# Patient Record
Sex: FEMALE | Race: WHITE | NOT HISPANIC OR LATINO | Employment: OTHER | URBAN - METROPOLITAN AREA
[De-identification: names, ages, dates, MRNs, and addresses within clinical notes are randomized per-mention and may not be internally consistent; named-entity substitution may affect disease eponyms.]

---

## 2017-02-19 ENCOUNTER — E-VISIT (OUTPATIENT)
Dept: FAMILY MEDICINE | Facility: CLINIC | Age: 22
End: 2017-02-19
Payer: COMMERCIAL

## 2017-02-19 DIAGNOSIS — F90.8 ATTENTION-DEFICIT HYPERACTIVITY DISORDER, OTHER TYPE: Primary | ICD-10-CM

## 2017-02-19 DIAGNOSIS — Z79.899 CONTROLLED SUBSTANCE AGREEMENT SIGNED: ICD-10-CM

## 2017-02-19 PROCEDURE — 98969 ZZC NONPHYSICIAN ONLINE ASSESSMENT AND MANAGEMENT: CPT | Performed by: NURSE PRACTITIONER

## 2017-02-19 NOTE — MR AVS SNAPSHOT
After Visit Summary   2/19/2017    Gabriela Hall    MRN: [id number]           Patient Information     Date Of Birth          1995        Visit Information        Provider Department      2/19/2017 5:17 PM Elva Davalos APRN CNP [facility]        Today's Diagnoses     Attention-deficit hyperactivity disorder, other type    -  1    Controlled substance agreement signed           Follow-ups after your visit        Who to contact     If you have questions or need follow up information about today's clinic visit or your schedule please contact [facility] directly at [phone].  Normal or non-critical lab and imaging results will be communicated to you by MyChart, letter or phone within 4 business days after the clinic has received the results. If you do not hear from us within 7 days, please contact the clinic through Thumb[employer]hart or phone. If you have a critical or abnormal lab result, we will notify you by phone as soon as possible.  Submit refill requests through [employer] or call your pharmacy and they will forward the refill request to us. Please allow 3 business days for your refill to be completed.          Additional Information About Your Visit        MyChart Information     [employer] gives you secure access to your electronic health record. If you see a primary care provider, you can also send messages to your care team and make appointments. If you have questions, please call your primary care clinic.  If you do not have a primary care provider, please call [phone] and they will assist you.        Care EveryWhere ID     This is your Care EveryWhere ID. This could be used by other organizations to access your Center medical records  [id number]         Blood Pressure from Last 3 Encounters:   10/19/16 118/66   06/15/16 116/74   02/15/16 122/58    Weight from Last 3 Encounters:   10/19/16 148 lb (67.1 kg)   06/15/16 143 lb (64.9 kg)    02/15/16 145 lb (65.8 kg)              Today, you had the following     No orders found for display         Today's Medication Changes          These changes are accurate as of: 2/19/17 11:59 PM.  If you have any questions, ask your nurse or doctor.               Start taking these medicines.        Dose/Directions    * lisdexamfetamine 30 MG capsule   Commonly known as:  VYVANSE   Used for:  Attention-deficit hyperactivity disorder, other type, Controlled substance agreement signed        Dose:  30 mg   Take 1 capsule (30 mg) by mouth daily   Quantity:  30 capsule   Refills:  0       * lisdexamfetamine 30 MG capsule   Commonly known as:  VYVANSE   Used for:  Attention-deficit hyperactivity disorder, other type, Controlled substance agreement signed        Dose:  30 mg   Start taking on:  3/23/2017   Take 1 capsule (30 mg) by mouth daily   Quantity:  30 capsule   Refills:  0       * lisdexamfetamine 30 MG capsule   Commonly known as:  VYVANSE   Used for:  Attention-deficit hyperactivity disorder, other type, Controlled substance agreement signed        Dose:  30 mg   Start taking on:  4/23/2017   Take 1 capsule (30 mg) by mouth daily   Quantity:  30 capsule   Refills:  0       * Notice:  This list has 3 medication(s) that are the same as other medications prescribed for you. Read the directions carefully, and ask your doctor or other care provider to review them with you.         Where to get your medicines      Some of these will need a paper prescription and others can be bought over the counter.  Ask your nurse if you have questions.     Bring a paper prescription for each of these medications     lisdexamfetamine 30 MG capsule    lisdexamfetamine 30 MG capsule    lisdexamfetamine 30 MG capsule                Primary Care Provider Office Phone # Fax #    JOI Dias [facility] [phone]       [facility] [address] [place] [address]        Thank you!      Thank you for choosing [facility]  for your care. Our goal is always to provide you with excellent care. Hearing back from our patients is one way we can continue to improve our services. Please take a few minutes to complete the written survey that you may receive in the mail after your visit with us. Thank you!             Your Updated Medication List - Protect others around you: Learn how to safely use, store and throw away your medicines at www.disposemymeds.org.          This list is accurate as of: 2/19/17 11:59 PM.  Always use your most recent med list.                   Brand Name Dispense Instructions for use    adapalene-benzoyl peroxide 0.1-2.5 % gel    EPIDUO    45 g    Apply to face daily at night.       azithromycin 250 MG tablet    ZITHROMAX    6 tablet    Two tablets first day, then one tablet daily for four days.       clindamycin 1 % lotion    CLEOCIN T    60 mL    Apply topically 2 times daily       clobetasol 0.05 % cream    TEMOVATE    15 g    Apply topically 2 times daily Use on individual acne lesions for no more then 4 days       desonide 0.05 % cream    DESOWEN    60 g    Apply to dry inflamed skin around lips at night       erythromycin 250 MG Tbec EC tablet    MANJEET-TAB    120 tablet    Take 2 tablets (500 mg) by mouth 2 times daily (with meals)       ketoconazole 2 % cream    NIZORAL    60 g    Apply to dry inflamed skin around lips every morning       * lisdexamfetamine 30 MG capsule    VYVANSE    30 capsule    Take 1 capsule (30 mg) by mouth daily       * lisdexamfetamine 30 MG capsule   Start taking on:  3/23/2017    VYVANSE    30 capsule    Take 1 capsule (30 mg) by mouth daily       * lisdexamfetamine 30 MG capsule   Start taking on:  4/23/2017    VYVANSE    30 capsule    Take 1 capsule (30 mg) by mouth daily       tretinoin 0.025 % cream    RETIN-A    45 g    Apply topically At Bedtime       * Notice:  This list has 3 medication(s) that are the same as other  medications prescribed for you. Read the directions carefully, and ask your doctor or other care provider to review them with you.

## 2017-02-20 RX ORDER — LISDEXAMFETAMINE DIMESYLATE 30 MG/1
30 CAPSULE ORAL DAILY
Qty: 30 CAPSULE | Refills: 0 | Status: SHIPPED | OUTPATIENT
Start: 2017-03-23 | End: 2017-05-30

## 2017-02-20 RX ORDER — LISDEXAMFETAMINE DIMESYLATE 30 MG/1
30 CAPSULE ORAL DAILY
Qty: 30 CAPSULE | Refills: 0 | Status: SHIPPED | OUTPATIENT
Start: 2017-02-20 | End: 2017-05-30

## 2017-02-20 RX ORDER — LISDEXAMFETAMINE DIMESYLATE 30 MG/1
30 CAPSULE ORAL DAILY
Qty: 30 CAPSULE | Refills: 0 | Status: SHIPPED | OUTPATIENT
Start: 2017-04-23 | End: 2017-05-30

## 2017-05-30 ENCOUNTER — OFFICE VISIT (OUTPATIENT)
Dept: FAMILY MEDICINE | Facility: CLINIC | Age: 22
End: 2017-05-30
Payer: COMMERCIAL

## 2017-05-30 VITALS
TEMPERATURE: 97 F | BODY MASS INDEX: 24.64 KG/M2 | HEIGHT: 67 IN | OXYGEN SATURATION: 97 % | WEIGHT: 157 LBS | SYSTOLIC BLOOD PRESSURE: 128 MMHG | HEART RATE: 97 BPM | DIASTOLIC BLOOD PRESSURE: 84 MMHG

## 2017-05-30 DIAGNOSIS — F90.8 ATTENTION-DEFICIT HYPERACTIVITY DISORDER, OTHER TYPE: Primary | ICD-10-CM

## 2017-05-30 DIAGNOSIS — Z11.3 SCREEN FOR STD (SEXUALLY TRANSMITTED DISEASE): ICD-10-CM

## 2017-05-30 DIAGNOSIS — Z79.899 CONTROLLED SUBSTANCE AGREEMENT SIGNED: ICD-10-CM

## 2017-05-30 DIAGNOSIS — Z23 NEED FOR TETANUS BOOSTER: ICD-10-CM

## 2017-05-30 PROCEDURE — 87591 N.GONORRHOEAE DNA AMP PROB: CPT | Performed by: NURSE PRACTITIONER

## 2017-05-30 PROCEDURE — 90471 IMMUNIZATION ADMIN: CPT | Performed by: NURSE PRACTITIONER

## 2017-05-30 PROCEDURE — 90651 9VHPV VACCINE 2/3 DOSE IM: CPT | Performed by: NURSE PRACTITIONER

## 2017-05-30 PROCEDURE — 90472 IMMUNIZATION ADMIN EACH ADD: CPT | Performed by: NURSE PRACTITIONER

## 2017-05-30 PROCEDURE — 87491 CHLMYD TRACH DNA AMP PROBE: CPT | Performed by: NURSE PRACTITIONER

## 2017-05-30 PROCEDURE — 99213 OFFICE O/P EST LOW 20 MIN: CPT | Mod: 25 | Performed by: NURSE PRACTITIONER

## 2017-05-30 PROCEDURE — 90715 TDAP VACCINE 7 YRS/> IM: CPT | Performed by: NURSE PRACTITIONER

## 2017-05-30 RX ORDER — LISDEXAMFETAMINE DIMESYLATE 30 MG/1
30 CAPSULE ORAL DAILY
Qty: 30 CAPSULE | Refills: 0 | Status: SHIPPED | OUTPATIENT
Start: 2017-06-30 | End: 2017-09-06

## 2017-05-30 RX ORDER — LISDEXAMFETAMINE DIMESYLATE 30 MG/1
30 CAPSULE ORAL DAILY
Qty: 30 CAPSULE | Refills: 0 | Status: SHIPPED | OUTPATIENT
Start: 2017-07-30 | End: 2017-09-06

## 2017-05-30 RX ORDER — LISDEXAMFETAMINE DIMESYLATE 30 MG/1
30 CAPSULE ORAL DAILY
Qty: 30 CAPSULE | Refills: 0 | Status: SHIPPED | OUTPATIENT
Start: 2017-05-30 | End: 2017-09-06

## 2017-05-30 NOTE — MR AVS SNAPSHOT
After Visit Summary   5/30/2017    Gabriela Hall    MRN: [id number]           Patient Information     Date Of Birth          1995        Visit Information        Provider Department      5/30/2017 3:00 PM Elva Davalos APRN CNP [facility]        Today's Diagnoses     Attention-deficit hyperactivity disorder, other type    -  1    Controlled substance agreement signed        Need for tetanus booster        Screen for STD (sexually transmitted disease)           Follow-ups after your visit        Future tests that were ordered for you today     Open Standing Orders        Priority Remaining Interval Expires Ordered    HUMAN PAPILLOMAVIRUS VACCINE Routine 2/3 Monthly 5/30/2018 5/30/2017            Who to contact     If you have questions or need follow up information about today's clinic visit or your schedule please contact [facility] directly at [phone].  Normal or non-critical lab and imaging results will be communicated to you by MyChart, letter or phone within 4 business days after the clinic has received the results. If you do not hear from us within 7 days, please contact the clinic through VoCarehart or phone. If you have a critical or abnormal lab result, we will notify you by phone as soon as possible.  Submit refill requests through [employer] or call your pharmacy and they will forward the refill request to us. Please allow 3 business days for your refill to be completed.          Additional Information About Your Visit        MyChart Information     [employer] gives you secure access to your electronic health record. If you see a primary care provider, you can also send messages to your care team and make appointments. If you have questions, please call your primary care clinic.  If you do not have a primary care provider, please call [phone] and they will assist you.        Care EveryWhere ID     This is your Care EveryWhere ID.  "This could be used by other organizations to access your [place] medical records  [id number]        Your Vitals Were     Pulse Temperature Height Pulse Oximetry BMI (Body Mass Index)       97 97  F (36.1  C) (Oral) 5' 6.5\" (1.689 m) 97% 24.96 kg/m2        Blood Pressure from Last 3 Encounters:   05/30/17 128/84   10/19/16 118/66   06/15/16 116/74    Weight from Last 3 Encounters:   05/30/17 157 lb (71.2 kg)   10/19/16 148 lb (67.1 kg)   06/15/16 143 lb (64.9 kg)              We Performed the Following     ADMIN 1st VACCINE     Chlamydia trachomatis PCR     EA ADD'L VACCINE     HUMAN PAPILLOMA VIRUS (GARDASIL 9) VACCINE     Neisseria gonorrhoeae PCR     TDAP VACCINE (ADACEL)          Today's Medication Changes          These changes are accurate as of: 5/30/17 11:59 PM.  If you have any questions, ask your nurse or doctor.               Start taking these medicines.        Dose/Directions    * lisdexamfetamine 30 MG capsule   Commonly known as:  VYVANSE   Used for:  Attention-deficit hyperactivity disorder, other type, Controlled substance agreement signed   Started by:  Elva Davalos APRN CNP        Dose:  30 mg   Take 1 capsule (30 mg) by mouth daily   Quantity:  30 capsule   Refills:  0       * lisdexamfetamine 30 MG capsule   Commonly known as:  VYVANSE   Used for:  Attention-deficit hyperactivity disorder, other type, Controlled substance agreement signed   Started by:  Elva Davalos APRN CNP        Dose:  30 mg   Start taking on:  6/30/2017   Take 1 capsule (30 mg) by mouth daily   Quantity:  30 capsule   Refills:  0       * lisdexamfetamine 30 MG capsule   Commonly known as:  VYVANSE   Used for:  Attention-deficit hyperactivity disorder, other type, Controlled substance agreement signed   Started by:  Elva Davalos APRN CNP        Dose:  30 mg   Start taking on:  7/30/2017   Take 1 capsule (30 mg) by mouth daily   Quantity:  30 capsule   Refills:  0       * Notice:  This " list has 3 medication(s) that are the same as other medications prescribed for you. Read the directions carefully, and ask your doctor or other care provider to review them with you.         Where to get your medicines      Some of these will need a paper prescription and others can be bought over the counter.  Ask your nurse if you have questions.     Bring a paper prescription for each of these medications     lisdexamfetamine 30 MG capsule    lisdexamfetamine 30 MG capsule    lisdexamfetamine 30 MG capsule                Primary Care Provider Office Phone # Fax #    Elva JOI Morillo [phone]       [facility] [address] [place] [address]        Thank you!     Thank you for choosing [facility]  for your care. Our goal is always to provide you with excellent care. Hearing back from our patients is one way we can continue to improve our services. Please take a few minutes to complete the written survey that you may receive in the mail after your visit with us. Thank you!             Your Updated Medication List - Protect others around you: Learn how to safely use, store and throw away your medicines at www.disposemymeds.org.          This list is accurate as of: 5/30/17 11:59 PM.  Always use your most recent med list.                   Brand Name Dispense Instructions for use    adapalene-benzoyl peroxide 0.1-2.5 % gel    EPIDUO    45 g    Apply to face daily at night.       clindamycin 1 % lotion    CLEOCIN T    60 mL    Apply topically 2 times daily       clobetasol 0.05 % cream    TEMOVATE    15 g    Apply topically 2 times daily Use on individual acne lesions for no more then 4 days       desonide 0.05 % cream    DESOWEN    60 g    Apply to dry inflamed skin around lips at night       ketoconazole 2 % cream    NIZORAL    60 g    Apply to dry inflamed skin around lips every morning       * lisdexamfetamine 30 MG capsule    VYVANSE     30 capsule    Take 1 capsule (30 mg) by mouth daily       * lisdexamfetamine 30 MG capsule   Start taking on:  6/30/2017    VYVANSE    30 capsule    Take 1 capsule (30 mg) by mouth daily       * lisdexamfetamine 30 MG capsule   Start taking on:  7/30/2017    VYVANSE    30 capsule    Take 1 capsule (30 mg) by mouth daily       tretinoin 0.025 % cream    RETIN-A    45 g    Apply topically At Bedtime       * Notice:  This list has 3 medication(s) that are the same as other medications prescribed for you. Read the directions carefully, and ask your doctor or other care provider to review them with you.

## 2017-05-30 NOTE — PROGRESS NOTES
SUBJECTIVE:                                                    Gabriela Hall is a 21 year old female who presents to clinic today for the following health issues:      Medication Followup of vyvanse     Taking Medication as prescribed: yes    Side Effects:  None    Medication Helping Symptoms:  yes     She reports she has been on Vyvanse for a while and is doing excellent with the current dosing.  Insurance does refer the medication/it is affordable for her.  No side effects or problems.  When she does not take it, she notices a big difference.    Immunizations:  Today she is requesting any immunization updates.  As I reviewed her immunization history, she is due a tetanus shot today, as well as the human papilloma virus series.    Problem list and histories reviewed & adjusted, as indicated.  Additional history: as documented    Patient Active Problem List   Diagnosis     Acne     Controlled substance agreement signed     Attention-deficit hyperactivity disorder, other type     Past Surgical History:   Procedure Laterality Date     NO HISTORY OF SURGERY         Social History   Substance Use Topics     Smoking status: Never Smoker     Smokeless tobacco: Never Used     Alcohol use Yes      Comment: socially     Family History   Problem Relation Age of Onset     Respiratory Maternal Grandmother      Alzheimer Disease Paternal Grandmother      HEART DISEASE Paternal Grandfather      CANCER Paternal Grandfather      ?melanoma--precancer     Psychotic Disorder Brother      ADHD     Skin Cancer No family hx of      no skin cancer         Current Outpatient Prescriptions   Medication Sig Dispense Refill     clindamycin (CLEOCIN T) 1 % lotion Apply topically 2 times daily 60 mL 6     tretinoin (RETIN-A) 0.025 % cream Apply topically At Bedtime 45 g 5     adapalene-benzoyl peroxide (EPIDUO) 0.1-2.5 % gel Apply to face daily at night. 45 g 6     clobetasol (TEMOVATE) 0.05 % cream Apply topically 2 times daily Use on  "individual acne lesions for no more then 4 days 15 g 0     ketoconazole (NIZORAL) 2 % cream Apply to dry inflamed skin around lips every morning 60 g 2     desonide (DESOWEN) 0.05 % cream Apply to dry inflamed skin around lips at night 60 g 2     Allergies   Allergen Reactions     Amoxil [Amoxicillin] Rash     No lab results found.   BP Readings from Last 3 Encounters:   05/30/17 128/84   10/19/16 118/66   06/15/16 116/74    Wt Readings from Last 3 Encounters:   05/30/17 157 lb (71.2 kg)   10/19/16 148 lb (67.1 kg)   06/15/16 143 lb (64.9 kg)                  Labs reviewed in EPIC    Reviewed and updated as needed this visit by clinical staff  Tobacco  Allergies  Meds  Med Hx  Surg Hx  Fam Hx  Soc Hx      Reviewed and updated as needed this visit by Provider         ROS:  Constitutional, HEENT, cardiovascular, pulmonary, gi and gu systems are negative, except as otherwise noted.    OBJECTIVE:                                                    /84  Pulse 97  Temp 97  F (36.1  C) (Oral)  Ht 5' 6.5\" (1.689 m)  Wt 157 lb (71.2 kg)  SpO2 97%  BMI 24.96 kg/m2  Body mass index is 24.96 kg/(m^2).  GENERAL APPEARANCE: healthy, alert and no distress. Smiling.   SKIN: warm and dry  PSYCH: mentation appears normal and affect normal/bright.  Good eye contact.       ASSESSMENT/PLAN:                                                    (F90.8) Attention-deficit hyperactivity disorder, other type  (primary encounter diagnosis)  Comment:   Plan: lisdexamfetamine (VYVANSE) 30 MG capsule,         lisdexamfetamine (VYVANSE) 30 MG capsule,         lisdexamfetamine (VYVANSE) 30 MG capsule            (Z79.899) Controlled substance agreement signed  Comment:   Plan: lisdexamfetamine (VYVANSE) 30 MG capsule,         lisdexamfetamine (VYVANSE) 30 MG capsule,         lisdexamfetamine (VYVANSE) 30 MG capsule            (Z23) Need for tetanus booster  Comment:   Plan: HUMAN PAPILLOMAVIRUS VACCINE, TDAP VACCINE         (ADACEL), " HUMAN PAPILLOMA VIRUS (GARDASIL 9)         VACCINE, ADMIN 1st VACCINE, EA ADD'L VACCINE            (Z11.3) Screen for STD (sexually transmitted disease)  Comment:   Plan: Chlamydia trachomatis PCR, Neisseria         gonorrhoeae PCR         The patient is stable on her by Christopher. Refills are given for 3 months. She will do an E visit in 3 months for refills. Next a face-to-face appointment in 6 months.   STD testing done. The patient declines serum sexually transmitted disease testing.          JOI Andres [facility]

## 2017-05-30 NOTE — NURSING NOTE
"No chief complaint on file.      Initial /84  Pulse 97  Temp 97  F (36.1  C) (Oral)  Ht 5' 6.5\" (1.689 m)  Wt 157 lb (71.2 kg)  SpO2 97%  BMI 24.96 kg/m2 Estimated body mass index is 24.96 kg/(m^2) as calculated from the following:    Height as of this encounter: 5' 6.5\" (1.689 m).    Weight as of this encounter: 157 lb (71.2 kg).  Medication Reconciliation: complete         Barber Burrell MA       "

## 2017-05-30 NOTE — NURSING NOTE
Prior to injection verified patient identity using patient's name and date of birth.  Screening Questionnaire for Adult Immunization     Are you sick today?   No    Do you have allergies to medications, food or any vaccine?   No    Have you ever had a serious reaction after receiving a vaccination?   No    Do you have a long-term health problem with heart disease, lung disease,  asthma, kidney disease, diabetes, anemia, metabolic or blood disease?   No    Do you have cancer, leukemia, AIDS, or any immune system problem?   No    Do you take cortisone, prednisone, other steroids, or anticancer drugs, or  have you had any x-ray (radiation) treatments?   No    Have you had a seizure, brain, or other nervous system problem?   No    During the past year, have you received a transfusion of blood or blood       products, or been given a medicine called immune (gamma) globulin?   No    For women: Are you pregnant or is there a chance you could become         pregnant during the next month?   No    Have you received any vaccinations in the past 4 weeks?   No     Immunization questionnaire answers were all negative.      MNVFC doesn't apply on this patient     Screening performed by Barber Burrell on 5/30/2017 at 3:53 PM.  Per orders of co, injection(s) of hpv and tdap given by Barber Burrell. Patient instructed to remain in clinic for 20 minutes afterwards, and to report any adverse reaction to me immediately.

## 2017-05-31 LAB
C TRACH DNA SPEC QL NAA+PROBE: NORMAL
N GONORRHOEA DNA SPEC QL NAA+PROBE: NORMAL
SPECIMEN SOURCE: NORMAL
SPECIMEN SOURCE: NORMAL

## 2017-05-31 NOTE — PROGRESS NOTES
Brandee Ochoa,    This is to let you know that the results of your recent gonorrhea and chlamydia tests are all normal/negative.    Let me know if you have any questions.    Elva

## 2017-09-02 ENCOUNTER — MYC MEDICAL ADVICE (OUTPATIENT)
Dept: FAMILY MEDICINE | Facility: CLINIC | Age: 22
End: 2017-09-02

## 2017-09-05 ENCOUNTER — MYC REFILL (OUTPATIENT)
Dept: FAMILY MEDICINE | Facility: CLINIC | Age: 22
End: 2017-09-05

## 2017-09-05 ENCOUNTER — E-VISIT (OUTPATIENT)
Dept: FAMILY MEDICINE | Facility: CLINIC | Age: 22
End: 2017-09-05
Payer: COMMERCIAL

## 2017-09-05 ENCOUNTER — MYC MEDICAL ADVICE (OUTPATIENT)
Dept: FAMILY MEDICINE | Facility: CLINIC | Age: 22
End: 2017-09-05

## 2017-09-05 DIAGNOSIS — F90.8 ATTENTION-DEFICIT HYPERACTIVITY DISORDER, OTHER TYPE: ICD-10-CM

## 2017-09-05 DIAGNOSIS — Z79.899 CONTROLLED SUBSTANCE AGREEMENT SIGNED: ICD-10-CM

## 2017-09-05 PROCEDURE — 98969 ZZC NONPHYSICIAN ONLINE ASSESSMENT AND MANAGEMENT: CPT | Performed by: NURSE PRACTITIONER

## 2017-09-05 RX ORDER — LISDEXAMFETAMINE DIMESYLATE 30 MG/1
30 CAPSULE ORAL DAILY
Qty: 30 CAPSULE | Refills: 0 | Status: CANCELLED | OUTPATIENT
Start: 2017-09-05

## 2017-09-05 NOTE — TELEPHONE ENCOUNTER
Message from [employer]:  Original authorizing provider: JOI Andres CNP would like a refill of the following medications:  lisdexamfetamine (VYVANSE) 30 MG capsule [JOI Andres CNP]    Preferred pharmacy: [facility] - [place], [place] - [id number]    Comment:

## 2017-09-06 RX ORDER — LISDEXAMFETAMINE DIMESYLATE 30 MG/1
30 CAPSULE ORAL DAILY
Qty: 30 CAPSULE | Refills: 0 | Status: SHIPPED | OUTPATIENT
Start: 2017-09-06 | End: 2017-12-12

## 2017-09-06 RX ORDER — LISDEXAMFETAMINE DIMESYLATE 30 MG/1
30 CAPSULE ORAL DAILY
Qty: 30 CAPSULE | Refills: 0 | Status: SHIPPED | OUTPATIENT
Start: 2017-10-06 | End: 2017-12-12

## 2017-09-06 RX ORDER — LISDEXAMFETAMINE DIMESYLATE 30 MG/1
30 CAPSULE ORAL DAILY
Qty: 30 CAPSULE | Refills: 0 | Status: SHIPPED | OUTPATIENT
Start: 2017-11-06 | End: 2017-12-12

## 2017-09-06 NOTE — TELEPHONE ENCOUNTER
CO would you prefer the E visit for this request? Vyvanse.  Looks like she has submitted an E visit.   Mindy Reyes RN

## 2017-09-14 ENCOUNTER — MYC REFILL (OUTPATIENT)
Dept: DERMATOLOGY | Facility: CLINIC | Age: 22
End: 2017-09-14

## 2017-09-14 DIAGNOSIS — L70.0 ACNE VULGARIS: ICD-10-CM

## 2017-09-14 RX ORDER — CLINDAMYCIN PHOSPHATE 10 UG/ML
LOTION TOPICAL 2 TIMES DAILY
Qty: 60 ML | Refills: 6 | Status: CANCELLED | OUTPATIENT
Start: 2017-09-14

## 2017-09-14 RX ORDER — TRETINOIN 0.25 MG/G
CREAM TOPICAL AT BEDTIME
Qty: 45 G | Refills: 5 | Status: CANCELLED | OUTPATIENT
Start: 2017-09-14

## 2017-10-04 ENCOUNTER — OFFICE VISIT (OUTPATIENT)
Dept: FAMILY MEDICINE | Facility: CLINIC | Age: 22
End: 2017-10-04
Payer: COMMERCIAL

## 2017-10-04 VITALS
SYSTOLIC BLOOD PRESSURE: 122 MMHG | HEART RATE: 81 BPM | WEIGHT: 156 LBS | BODY MASS INDEX: 24.8 KG/M2 | RESPIRATION RATE: 16 BRPM | OXYGEN SATURATION: 98 % | DIASTOLIC BLOOD PRESSURE: 76 MMHG | TEMPERATURE: 98 F

## 2017-10-04 DIAGNOSIS — F90.8 ATTENTION DEFICIT HYPERACTIVITY DISORDER (ADHD), OTHER TYPE: ICD-10-CM

## 2017-10-04 DIAGNOSIS — Z23 NEED FOR HPV VACCINATION: ICD-10-CM

## 2017-10-04 DIAGNOSIS — F41.8 SITUATIONAL ANXIETY: Primary | ICD-10-CM

## 2017-10-04 PROCEDURE — 90651 9VHPV VACCINE 2/3 DOSE IM: CPT | Performed by: NURSE PRACTITIONER

## 2017-10-04 PROCEDURE — 90471 IMMUNIZATION ADMIN: CPT | Performed by: NURSE PRACTITIONER

## 2017-10-04 PROCEDURE — 99214 OFFICE O/P EST MOD 30 MIN: CPT | Mod: 25 | Performed by: NURSE PRACTITIONER

## 2017-10-04 ASSESSMENT — ANXIETY QUESTIONNAIRES
3. WORRYING TOO MUCH ABOUT DIFFERENT THINGS: MORE THAN HALF THE DAYS
5. BEING SO RESTLESS THAT IT IS HARD TO SIT STILL: SEVERAL DAYS
GAD7 TOTAL SCORE: 9
6. BECOMING EASILY ANNOYED OR IRRITABLE: MORE THAN HALF THE DAYS
IF YOU CHECKED OFF ANY PROBLEMS ON THIS QUESTIONNAIRE, HOW DIFFICULT HAVE THESE PROBLEMS MADE IT FOR YOU TO DO YOUR WORK, TAKE CARE OF THINGS AT HOME, OR GET ALONG WITH OTHER PEOPLE: SOMEWHAT DIFFICULT
1. FEELING NERVOUS, ANXIOUS, OR ON EDGE: SEVERAL DAYS
7. FEELING AFRAID AS IF SOMETHING AWFUL MIGHT HAPPEN: SEVERAL DAYS
2. NOT BEING ABLE TO STOP OR CONTROL WORRYING: SEVERAL DAYS

## 2017-10-04 ASSESSMENT — PATIENT HEALTH QUESTIONNAIRE - PHQ9
SUM OF ALL RESPONSES TO PHQ QUESTIONS 1-9: 8
5. POOR APPETITE OR OVEREATING: SEVERAL DAYS

## 2017-10-04 NOTE — NURSING NOTE
"Chief Complaint   Patient presents with     Mental Health Problem       Initial /76  Pulse 81  Temp 98  F (36.7  C) (Oral)  Resp 16  Wt 156 lb (70.8 kg)  SpO2 98%  BMI 24.8 kg/m2 Estimated body mass index is 24.8 kg/(m^2) as calculated from the following:    Height as of 5/30/17: 5' 6.5\" (1.689 m).    Weight as of this encounter: 156 lb (70.8 kg).  Medication Reconciliation: david Burrell MA     Prior to injection verified patient identity using patient's name and date of birth.  Screening Questionnaire for Adult Immunization     Are you sick today?   No    Do you have allergies to medications, food or any vaccine?   Amoxicillin     Have you ever had a serious reaction after receiving a vaccination?   No    Do you have a long-term health problem with heart disease, lung disease,  asthma, kidney disease, diabetes, anemia, metabolic or blood disease?   No    Do you have cancer, leukemia, AIDS, or any immune system problem?   No    Do you take cortisone, prednisone, other steroids, or anticancer drugs, or  have you had any x-ray (radiation) treatments?   No    Have you had a seizure, brain, or other nervous system problem?   No    During the past year, have you received a transfusion of blood or blood       products, or been given a medicine called immune (gamma) globulin?   No    For women: Are you pregnant or is there a chance you could become         pregnant during the next month?   No    Have you received any vaccinations in the past 4 weeks?   No     Immunization questionnaire answers were all negative.      MNVFC doesn't apply on this patient     Screening performed by Barber Burrell on 10/4/2017 at 4:08 PM.  Per orders of gely owens, injection(s) of hpv given by Barber Burrell. Patient instructed to remain in clinic for 20 minutes afterwards, and to report any adverse reaction to me immediately.      "

## 2017-10-04 NOTE — PROGRESS NOTES
"  SUBJECTIVE:   Gabriela Hall is a 21 year old female who presents to clinic today for the following health issues:  Chief Complaint   Patient presents with     Mental Health Problem         discuss mental health.   Gabriela has noticed that she has been having more anxiety.  She is wondering what is \"normal.\"  She works full time.  Her work start time varies day to day.  She works for the [employer] at the [employer], .  Not currently in a relationship.  Great friendship Jena.  Her anxiety has been the last month.  She is living with a roommate. That is going well.    Vyvanse:  Going well.  She reports that the medication is working well and she does not need refills at this time.    Problem list and histories reviewed & adjusted, as indicated.  Additional history: as documented    Patient Active Problem List   Diagnosis     Acne     Controlled substance agreement signed     Attention-deficit hyperactivity disorder, other type     Past Surgical History:   Procedure Laterality Date     NO HISTORY OF SURGERY         Social History   Substance Use Topics     Smoking status: Never Smoker     Smokeless tobacco: Never Used     Alcohol use Yes      Comment: socially     Family History   Problem Relation Age of Onset     Respiratory Maternal Grandmother      Alzheimer Disease Paternal Grandmother      HEART DISEASE Paternal Grandfather      CANCER Paternal Grandfather      ?melanoma--precancer     Psychotic Disorder Brother      ADHD     Skin Cancer No family hx of      no skin cancer         Current Outpatient Prescriptions   Medication Sig Dispense Refill     [START ON 11/6/2017] lisdexamfetamine (VYVANSE) 30 MG capsule Take 1 capsule (30 mg) by mouth daily 30 capsule 0     [START ON 10/6/2017] lisdexamfetamine (VYVANSE) 30 MG capsule Take 1 capsule (30 mg) by mouth daily (Patient not taking: Reported on 10/4/2017) 30 capsule 0     lisdexamfetamine (VYVANSE) 30 MG capsule Take 1 " capsule (30 mg) by mouth daily (Patient not taking: Reported on 10/4/2017) 30 capsule 0     clindamycin (CLEOCIN T) 1 % lotion Apply topically 2 times daily (Patient not taking: Reported on 10/4/2017) 60 mL 6     tretinoin (RETIN-A) 0.025 % cream Apply topically At Bedtime (Patient not taking: Reported on 10/4/2017) 45 g 5     adapalene-benzoyl peroxide (EPIDUO) 0.1-2.5 % gel Apply to face daily at night. (Patient not taking: Reported on 10/4/2017) 45 g 6     clobetasol (TEMOVATE) 0.05 % cream Apply topically 2 times daily Use on individual acne lesions for no more then 4 days (Patient not taking: Reported on 10/4/2017) 15 g 0     ketoconazole (NIZORAL) 2 % cream Apply to dry inflamed skin around lips every morning (Patient not taking: Reported on 10/4/2017) 60 g 2     desonide (DESOWEN) 0.05 % cream Apply to dry inflamed skin around lips at night (Patient not taking: Reported on 10/4/2017) 60 g 2     Allergies   Allergen Reactions     Amoxil [Amoxicillin] Rash     No lab results found.   BP Readings from Last 3 Encounters:   10/04/17 122/76   05/30/17 128/84   10/19/16 118/66    Wt Readings from Last 3 Encounters:   10/04/17 156 lb (70.8 kg)   05/30/17 157 lb (71.2 kg)   10/19/16 148 lb (67.1 kg)                  Labs reviewed in EPIC          Reviewed and updated as needed this visit by clinical staff       Reviewed and updated as needed this visit by Provider         ROS:  Constitutional, HEENT, cardiovascular, pulmonary, GI, , musculoskeletal, neuro, skin, endocrine and psych systems are negative, except as otherwise noted.      OBJECTIVE:   /76  Pulse 81  Temp 98  F (36.7  C) (Oral)  Resp 16  Wt 156 lb (70.8 kg)  SpO2 98%  BMI 24.8 kg/m2  Body mass index is 24.8 kg/(m^2).  GENERAL: healthy, alert and no distress  SKIN: warm and dry  PSYCH: mentation appears normal, affect normal/bright      ASSESSMENT/PLAN:     (F41.8) Situational anxiety  (primary encounter diagnosis)  Comment: Uncertain/likely  short-term  Plan: I discussed with the patient the importance of taking good care of herself which will in turn help her mental health. Overall she feels like she is doing quite well and I believe her anxieties are controllable with lifestyle management and therapy. The patient agrees.  She has a therapist that she seen in her past. She states she is going to recheck that therapist schedule an appointment. I did discuss with her the potential of medication management in addition to her therapy. She will consider if needed.  She is due for a face-to-face clinic appointment for refills of her vyvanse in December.  When she returns for that refill appointment, she will discuss her situational anxieties as well. If necessary we will institute other treatments such as medications. She will return sooner if any worsening.    (F90.8) Attention deficit hyperactivity disorder (ADHD), other type  Comment: Stable   Plan: Continue medications as prescribed.  Return as documented above.     (Z23) Need for HPV vaccination  Comment: Routine   Plan: HUMAN PAPILLOMA VIRUS (GARDASIL 9) VACCINE,         ADMIN 1st VACCINE        Given. Return to the clinic per protocol for third and final vaccine.  The patient declines a flu shot today.      I spent a total of 30 min with the patient, >50% time spent face to face counseling regarding the above issues, establishing a plan of care, and coordinating follow up care.     JOI Andres [facility]

## 2017-10-04 NOTE — MR AVS SNAPSHOT
After Visit Summary   10/4/2017    Gabriela Hall    MRN: [id number]           Patient Information     Date Of Birth          1995        Visit Information        Provider Department      10/4/2017 2:20 PM Elva Davalos APRN CNP [facility]        Today's Diagnoses     Situational anxiety    -  1    Attention deficit hyperactivity disorder (ADHD), other type        Need for HPV vaccination           Follow-ups after your visit        Who to contact     If you have questions or need follow up information about today's clinic visit or your schedule please contact [facility] directly at [phone].  Normal or non-critical lab and imaging results will be communicated to you by [employer], letter or phone within 4 business days after the clinic has received the results. If you do not hear from us within 7 days, please contact the clinic through [employer] or phone. If you have a critical or abnormal lab result, we will notify you by phone as soon as possible.  Submit refill requests through [employer] or call your pharmacy and they will forward the refill request to us. Please allow 3 business days for your refill to be completed.          Additional Information About Your Visit        MyChart Information     [employer] gives you secure access to your electronic health record. If you see a primary care provider, you can also send messages to your care team and make appointments. If you have questions, please call your primary care clinic.  If you do not have a primary care provider, please call [phone] and they will assist you.        Care EveryWhere ID     This is your Care EveryWhere ID. This could be used by other organizations to access your [place] medical records  [id number]        Your Vitals Were     Pulse Temperature Respirations Pulse Oximetry BMI (Body Mass Index)       81 98  F (36.7  C) (Oral) 16 98% 24.8 kg/m2        Blood Pressure from  Last 3 Encounters:   10/04/17 122/76   05/30/17 128/84   10/19/16 118/66    Weight from Last 3 Encounters:   10/04/17 156 lb (70.8 kg)   05/30/17 157 lb (71.2 kg)   10/19/16 148 lb (67.1 kg)              We Performed the Following     ADMIN 1st VACCINE     HUMAN PAPILLOMA VIRUS (GARDASIL 9) VACCINE        Primary Care Provider Office Phone # Fax JOI Neumann [facility] [phone] [address] [place] [address]        Equal Access to Services     [facility] : Hadii aad ku hadasho Soomaali, waaxda luqadaha, qaybta kaalmada adeegyada, bee trujillo . So [facility] [phone].    ATENCIÓN: Si habla español, tiene a portillo disposición servicios gratuitos de asistencia lingüística. Ll[facility] [phone].    We comply with applicable federal civil rights laws and [place] laws. We do not discriminate on the basis of race, color, national origin, age, disability, sex, sexual orientation, or gender identity.            Thank you!     Thank you for choosing [facility]  for your care. Our goal is always to provide you with excellent care. Hearing back from our patients is one way we can continue to improve our services. Please take a few minutes to complete the written survey that you may receive in the mail after your visit with us. Thank you!             Your Updated Medication List - Protect others around you: Learn how to safely use, store and throw away your medicines at www.disposemymeds.org.          This list is accurate as of: 10/4/17  5:07 PM.  Always use your most recent med list.                   Brand Name Dispense Instructions for use Diagnosis    adapalene-benzoyl peroxide 0.1-2.5 % gel    EPIDUO    45 g    Apply to face daily at night.    Acne vulgaris       clindamycin 1 % lotion    CLEOCIN T    60 mL    Apply topically 2 times daily    Acne vulgaris       clobetasol 0.05 % cream    TEMOVATE    15 g    Apply topically 2  times daily Use on individual acne lesions for no more then 4 days    Acne vulgaris       desonide 0.05 % cream    DESOWEN    60 g    Apply to dry inflamed skin around lips at night    Cheilitis       ketoconazole 2 % cream    NIZORAL    60 g    Apply to dry inflamed skin around lips every morning    Cheilitis       * lisdexamfetamine 30 MG capsule    VYVANSE    30 capsule    Take 1 capsule (30 mg) by mouth daily    Attention-deficit hyperactivity disorder, other type, Controlled substance agreement signed       * lisdexamfetamine 30 MG capsule   Start taking on:  10/6/2017    VYVANSE    30 capsule    Take 1 capsule (30 mg) by mouth daily    Attention-deficit hyperactivity disorder, other type, Controlled substance agreement signed       * lisdexamfetamine 30 MG capsule   Start taking on:  11/6/2017    VYVANSE    30 capsule    Take 1 capsule (30 mg) by mouth daily    Attention-deficit hyperactivity disorder, other type, Controlled substance agreement signed       tretinoin 0.025 % cream    RETIN-A    45 g    Apply topically At Bedtime    Acne vulgaris       * Notice:  This list has 3 medication(s) that are the same as other medications prescribed for you. Read the directions carefully, and ask your doctor or other care provider to review them with you.

## 2017-10-05 ASSESSMENT — ANXIETY QUESTIONNAIRES: GAD7 TOTAL SCORE: 9

## 2017-12-10 ENCOUNTER — MYC MEDICAL ADVICE (OUTPATIENT)
Dept: FAMILY MEDICINE | Facility: CLINIC | Age: 22
End: 2017-12-10

## 2017-12-12 ENCOUNTER — OFFICE VISIT (OUTPATIENT)
Dept: FAMILY MEDICINE | Facility: CLINIC | Age: 22
End: 2017-12-12
Payer: COMMERCIAL

## 2017-12-12 VITALS
DIASTOLIC BLOOD PRESSURE: 73 MMHG | OXYGEN SATURATION: 98 % | SYSTOLIC BLOOD PRESSURE: 126 MMHG | WEIGHT: 157 LBS | HEIGHT: 67 IN | BODY MASS INDEX: 24.64 KG/M2 | TEMPERATURE: 98.7 F | HEART RATE: 78 BPM

## 2017-12-12 DIAGNOSIS — F90.8 ATTENTION DEFICIT HYPERACTIVITY DISORDER (ADHD), OTHER TYPE: Primary | ICD-10-CM

## 2017-12-12 DIAGNOSIS — Z79.899 CONTROLLED SUBSTANCE AGREEMENT SIGNED: ICD-10-CM

## 2017-12-12 PROCEDURE — 99213 OFFICE O/P EST LOW 20 MIN: CPT | Performed by: INTERNAL MEDICINE

## 2017-12-12 RX ORDER — LISDEXAMFETAMINE DIMESYLATE 30 MG/1
30 CAPSULE ORAL DAILY
Qty: 30 CAPSULE | Refills: 0 | Status: SHIPPED | OUTPATIENT
Start: 2018-02-12 | End: 2018-03-01

## 2017-12-12 RX ORDER — LISDEXAMFETAMINE DIMESYLATE 30 MG/1
30 CAPSULE ORAL DAILY
Qty: 30 CAPSULE | Refills: 0 | Status: SHIPPED | OUTPATIENT
Start: 2017-12-12 | End: 2018-03-01

## 2017-12-12 RX ORDER — LISDEXAMFETAMINE DIMESYLATE 30 MG/1
30 CAPSULE ORAL DAILY
Qty: 30 CAPSULE | Refills: 0 | Status: SHIPPED | OUTPATIENT
Start: 2018-01-12 | End: 2018-03-01

## 2017-12-12 NOTE — NURSING NOTE
"Chief Complaint   Patient presents with     Medication Refill     Pt in clinic to request refill for medication Vyvance.       Initial /73  Pulse 78  Temp 98.7  F (37.1  C) (Oral)  Ht 5' 7\" (1.702 m)  Wt 157 lb (71.2 kg)  LMP 11/20/2017  SpO2 98%  BMI 24.59 kg/m2 Estimated body mass index is 24.59 kg/(m^2) as calculated from the following:    Height as of this encounter: 5' 7\" (1.702 m).    Weight as of this encounter: 157 lb (71.2 kg).  Medication Reconciliation: complete   Patricia Dahl/ MA    "
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
## 2017-12-12 NOTE — MR AVS SNAPSHOT
"              After Visit Summary   12/12/2017    Gabriela Hall    MRN: [id number]           Patient Information     Date Of Birth          1995        Visit Information        Provider Department      12/12/2017 3:30 PM Jessica Das MD [facility]        Today's Diagnoses     Attention deficit hyperactivity disorder (ADHD), other type    -  1    Controlled substance agreement signed           Follow-ups after your visit        Who to contact     If you have questions or need follow up information about today's clinic visit or your schedule please contact [facility] directly at [phone].  Normal or non-critical lab and imaging results will be communicated to you by [employer], letter or phone within 4 business days after the clinic has received the results. If you do not hear from us within 7 days, please contact the clinic through [employer] or phone. If you have a critical or abnormal lab result, we will notify you by phone as soon as possible.  Submit refill requests through [employer] or call your pharmacy and they will forward the refill request to us. Please allow 3 business days for your refill to be completed.          Additional Information About Your Visit        MyChart Information     [employer] gives you secure access to your electronic health record. If you see a primary care provider, you can also send messages to your care team and make appointments. If you have questions, please call your primary care clinic.  If you do not have a primary care provider, please call [phone] and they will assist you.        Care EveryWhere ID     This is your Care EveryWhere ID. This could be used by other organizations to access your [place] medical records  [id number]        Your Vitals Were     Pulse Temperature Height Last Period Pulse Oximetry BMI (Body Mass Index)    78 98.7  F (37.1  C) (Oral) 5' 7\" (1.702 m) 11/20/2017 98% 24.59 kg/m2       Blood " Pressure from Last 3 Encounters:   12/12/17 126/73   10/04/17 122/76   05/30/17 128/84    Weight from Last 3 Encounters:   12/12/17 157 lb (71.2 kg)   10/04/17 156 lb (70.8 kg)   05/30/17 157 lb (71.2 kg)              Today, you had the following     No orders found for display         Where to get your medicines      Some of these will need a paper prescription and others can be bought over the counter.  Ask your nurse if you have questions.     Bring a paper prescription for each of these medications     lisdexamfetamine 30 MG capsule    lisdexamfetamine 30 MG capsule    lisdexamfetamine 30 MG capsule          Primary Care Provider Office Phone # Fax #    Elva JOI Morillo [phone] [address] [place] [address]        Equal Access to Services     KAZ MCDERMOTT : Hadii aad ku hadasho Soalise, waaxda luqadaha, qaybta kaalmada adeegyada, bee trujillo . So [facility] [phone].    ATENCIÓN: Si habla español, tiene a portillo disposición servicios gratuitos de asistencia lingüística. Llame al [phone].    We comply with applicable federal civil rights laws and [place] laws. We do not discriminate on the basis of race, color, national origin, age, disability, sex, sexual orientation, or gender identity.            Thank you!     Thank you for choosing [facility]  for your care. Our goal is always to provide you with excellent care. Hearing back from our patients is one way we can continue to improve our services. Please take a few minutes to complete the written survey that you may receive in the mail after your visit with us. Thank you!             Your Updated Medication List - Protect others around you: Learn how to safely use, store and throw away your medicines at www.disposemymeds.org.          This list is accurate as of: 12/12/17  3:57 PM.  Always use your most recent med list.                   Brand Name Dispense  Instructions for use Diagnosis    adapalene-benzoyl peroxide 0.1-2.5 % gel    EPIDUO    45 g    Apply to face daily at night.    Acne vulgaris       clindamycin 1 % lotion    CLEOCIN T    60 mL    Apply topically 2 times daily    Acne vulgaris       clobetasol 0.05 % cream    TEMOVATE    15 g    Apply topically 2 times daily Use on individual acne lesions for no more then 4 days    Acne vulgaris       desonide 0.05 % cream    DESOWEN    60 g    Apply to dry inflamed skin around lips at night    Cheilitis       ketoconazole 2 % cream    NIZORAL    60 g    Apply to dry inflamed skin around lips every morning    Cheilitis       * lisdexamfetamine 30 MG capsule    VYVANSE    30 capsule    Take 1 capsule (30 mg) by mouth daily    Controlled substance agreement signed, Attention deficit hyperactivity disorder (ADHD), other type       * lisdexamfetamine 30 MG capsule   Start taking on:  1/12/2018    VYVANSE    30 capsule    Take 1 capsule (30 mg) by mouth daily    Controlled substance agreement signed, Attention deficit hyperactivity disorder (ADHD), other type       * lisdexamfetamine 30 MG capsule   Start taking on:  2/12/2018    VYVANSE    30 capsule    Take 1 capsule (30 mg) by mouth daily    Controlled substance agreement signed, Attention deficit hyperactivity disorder (ADHD), other type       tretinoin 0.025 % cream    RETIN-A    45 g    Apply topically At Bedtime    Acne vulgaris       * Notice:  This list has 3 medication(s) that are the same as other medications prescribed for you. Read the directions carefully, and ask your doctor or other care provider to review them with you.

## 2018-02-27 ENCOUNTER — E-VISIT (OUTPATIENT)
Dept: FAMILY MEDICINE | Facility: CLINIC | Age: 23
End: 2018-02-27
Payer: COMMERCIAL

## 2018-02-27 DIAGNOSIS — F98.8 ATTENTION DEFICIT DISORDER, UNSPECIFIED HYPERACTIVITY PRESENCE: Primary | ICD-10-CM

## 2018-03-01 RX ORDER — LISDEXAMFETAMINE DIMESYLATE 30 MG/1
30 CAPSULE ORAL DAILY
Qty: 30 CAPSULE | Refills: 0 | Status: SHIPPED | OUTPATIENT
Start: 2018-05-02 | End: 2018-06-01

## 2018-03-01 RX ORDER — LISDEXAMFETAMINE DIMESYLATE 30 MG/1
30 CAPSULE ORAL DAILY
Qty: 30 CAPSULE | Refills: 0 | Status: SHIPPED | OUTPATIENT
Start: 2018-03-01 | End: 2018-03-31

## 2018-03-01 RX ORDER — LISDEXAMFETAMINE DIMESYLATE 30 MG/1
30 CAPSULE ORAL DAILY
Qty: 30 CAPSULE | Refills: 0 | Status: SHIPPED | OUTPATIENT
Start: 2018-04-01 | End: 2018-05-01

## 2018-05-10 ENCOUNTER — OFFICE VISIT (OUTPATIENT)
Dept: DERMATOLOGY | Facility: CLINIC | Age: 23
End: 2018-05-10
Payer: COMMERCIAL

## 2018-05-10 DIAGNOSIS — D23.9 DERMAL NEVUS: Primary | ICD-10-CM

## 2018-05-10 DIAGNOSIS — L70.0 ACNE VULGARIS: ICD-10-CM

## 2018-05-10 DIAGNOSIS — L71.0 PERIORAL DERMATITIS: ICD-10-CM

## 2018-05-10 RX ORDER — TRETINOIN 0.25 MG/G
CREAM TOPICAL AT BEDTIME
Qty: 45 G | Refills: 5 | Status: SHIPPED | OUTPATIENT
Start: 2018-05-10 | End: 2021-04-07

## 2018-05-10 RX ORDER — KETOCONAZOLE 20 MG/G
CREAM TOPICAL
Qty: 60 G | Refills: 2 | Status: SHIPPED | OUTPATIENT
Start: 2018-05-10 | End: 2021-04-07

## 2018-05-10 RX ORDER — CLOBETASOL PROPIONATE 0.5 MG/G
CREAM TOPICAL 2 TIMES DAILY
Qty: 15 G | Refills: 0 | Status: SHIPPED | OUTPATIENT
Start: 2018-05-10 | End: 2018-07-24

## 2018-05-10 RX ORDER — CLINDAMYCIN PHOSPHATE 10 UG/ML
LOTION TOPICAL 2 TIMES DAILY
Qty: 60 ML | Refills: 6 | Status: SHIPPED | OUTPATIENT
Start: 2018-05-10 | End: 2021-04-07

## 2018-05-10 ASSESSMENT — PAIN SCALES - GENERAL: PAINLEVEL: NO PAIN (0)
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
## 2018-05-10 NOTE — MR AVS SNAPSHOT
After Visit Summary   5/10/2018    Gabriela Hall    MRN: [id number]           Patient Information     Date Of Birth          1995        Visit Information        Provider Department      5/10/2018 5:00 PM Thiago Chong MD [facility] Dermatology        Today's Diagnoses     Dermal nevus    -  1    Acne vulgaris        Perioral dermatitis           Follow-ups after your visit        Who to contact     Please call your clinic at [phone] to:    Ask questions about your health    Make or cancel appointments    Discuss your medicines    Learn about your test results    Speak to your doctor            Additional Information About Your Visit        MyChart Information     [employer] gives you secure access to your electronic health record. If you see a primary care provider, you can also send messages to your care team and make appointments. If you have questions, please call your primary care clinic.  If you do not have a primary care provider, please call [phone] and they will assist you.      [employer] is an electronic gateway that provides easy, online access to your medical records. With [employer], you can request a clinic appointment, read your test results, renew a prescription or communicate with your care team.     To access your existing account, please contact your [facility] or call [phone] for assistance.        Care EveryWhere ID     This is your Care EveryWhere ID. This could be used by other organizations to access your [place] medical records  [id number]         Blood Pressure from Last 3 Encounters:   12/12/17 126/73   10/04/17 122/76   05/30/17 128/84    Weight from Last 3 Encounters:   12/12/17 71.2 kg (157 lb)   10/04/17 70.8 kg (156 lb)   05/30/17 71.2 kg (157 lb)              Today, you had the following     No orders found for display         Today's Medication Changes          These changes are accurate as of 5/10/18  9:43 PM.   If you have any questions, ask your nurse or doctor.               Stop taking these medicines if you haven't already. Please contact your care team if you have questions.     adapalene-benzoyl peroxide 0.1-2.5 % gel   Commonly known as:  EPIDUO   Stopped by:  Thiago Chong MD                Where to get your medicines      These medications were sent to [facility] - [place], [place] - [address]  [address], [place] [address]     Phone:  [phone]     clindamycin 1 % lotion    clobetasol 0.05 % cream    ketoconazole 2 % cream    tretinoin 0.025 % cream                Primary Care Provider Office Phone # Fax #    Elva JOI Morillo [facility] [phone]       [address] [place] [address]        Equal Access to Services     [facility] : Hadii aad ku hadasho Soomaali, waaxda luqadaha, qaybta kaalmada adeegyada, waxay idiin hayaamary trujillo . So [facility] [phone].    ATENCIÓN: Si habla español, tiene a portillo disposición servicios gratuitos de asistencia lingüística. Delfiname al [phone].    We comply with applicable federal civil rights laws and [place] laws. We do not discriminate on the basis of race, color, national origin, age, disability, sex, sexual orientation, or gender identity.            Thank you!     Thank you for choosing [facility] DERMATOLOGY  for your care. Our goal is always to provide you with excellent care. Hearing back from our patients is one way we can continue to improve our services. Please take a few minutes to complete the written survey that you may receive in the mail after your visit with us. Thank you!             Your Updated Medication List - Protect others around you: Learn how to safely use, store and throw away your medicines at www.disposemymeds.org.          This list is accurate as of 5/10/18  9:43 PM.  Always use your most recent med list.                   Brand Name Dispense Instructions for  use Diagnosis    clindamycin 1 % lotion    CLEOCIN T    60 mL    Apply topically 2 times daily    Acne vulgaris       clobetasol 0.05 % cream    TEMOVATE    15 g    Apply topically 2 times daily Use on individual acne lesions for no more then 4 days    Acne vulgaris       desonide 0.05 % cream    DESOWEN    60 g    Apply to dry inflamed skin around lips at night    Cheilitis       ketoconazole 2 % cream    NIZORAL    60 g    Apply to dry inflamed skin around lips every morning        lisdexamfetamine 30 MG capsule    VYVANSE    30 capsule    Take 1 capsule (30 mg) by mouth daily    Attention deficit disorder, unspecified hyperactivity presence       tretinoin 0.025 % cream    RETIN-A    45 g    Apply topically At Bedtime    Acne vulgaris
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
## 2018-05-10 NOTE — NURSING NOTE
Dermatology Rooming Note    Gabriela Hall's goals for this visit include:   Chief Complaint   Patient presents with     Derm Problem     Gabriela is here for a acne follow up, states it's improved.  States she needs her medications renewed for the year.        Alannah Quispe LPN

## 2018-07-24 ENCOUNTER — OFFICE VISIT (OUTPATIENT)
Dept: FAMILY MEDICINE | Facility: CLINIC | Age: 23
End: 2018-07-24
Payer: COMMERCIAL

## 2018-07-24 VITALS
HEART RATE: 83 BPM | DIASTOLIC BLOOD PRESSURE: 68 MMHG | TEMPERATURE: 100.2 F | WEIGHT: 140.4 LBS | BODY MASS INDEX: 21.99 KG/M2 | SYSTOLIC BLOOD PRESSURE: 110 MMHG | RESPIRATION RATE: 19 BRPM | OXYGEN SATURATION: 99 %

## 2018-07-24 DIAGNOSIS — F90.8 ATTENTION DEFICIT HYPERACTIVITY DISORDER (ADHD), OTHER TYPE: Primary | ICD-10-CM

## 2018-07-24 DIAGNOSIS — Z23 NEED FOR HPV VACCINATION: ICD-10-CM

## 2018-07-24 DIAGNOSIS — L70.0 ACNE VULGARIS: ICD-10-CM

## 2018-07-24 DIAGNOSIS — Z11.3 SCREEN FOR STD (SEXUALLY TRANSMITTED DISEASE): ICD-10-CM

## 2018-07-24 LAB
AMPHETAMINES UR QL: ABNORMAL NG/ML
BARBITURATES UR QL SCN: NOT DETECTED NG/ML
BENZODIAZ UR QL SCN: NOT DETECTED NG/ML
BUPRENORPHINE UR QL: NOT DETECTED NG/ML
CANNABINOIDS UR QL: NOT DETECTED NG/ML
COCAINE UR QL SCN: NOT DETECTED NG/ML
D-METHAMPHET UR QL: NOT DETECTED NG/ML
METHADONE UR QL SCN: NOT DETECTED NG/ML
OPIATES UR QL SCN: NOT DETECTED NG/ML
OXYCODONE UR QL SCN: NOT DETECTED NG/ML
PCP UR QL SCN: NOT DETECTED NG/ML
PROPOXYPH UR QL: NOT DETECTED NG/ML
TRICYCLICS UR QL SCN: NOT DETECTED NG/ML

## 2018-07-24 PROCEDURE — 90651 9VHPV VACCINE 2/3 DOSE IM: CPT | Performed by: NURSE PRACTITIONER

## 2018-07-24 PROCEDURE — 87591 N.GONORRHOEAE DNA AMP PROB: CPT | Performed by: NURSE PRACTITIONER

## 2018-07-24 PROCEDURE — 99214 OFFICE O/P EST MOD 30 MIN: CPT | Mod: 25 | Performed by: NURSE PRACTITIONER

## 2018-07-24 PROCEDURE — 87491 CHLMYD TRACH DNA AMP PROBE: CPT | Performed by: NURSE PRACTITIONER

## 2018-07-24 PROCEDURE — 80306 DRUG TEST PRSMV INSTRMNT: CPT | Performed by: NURSE PRACTITIONER

## 2018-07-24 PROCEDURE — 90471 IMMUNIZATION ADMIN: CPT | Performed by: NURSE PRACTITIONER

## 2018-07-24 RX ORDER — CLOBETASOL PROPIONATE 0.5 MG/G
CREAM TOPICAL 2 TIMES DAILY
Qty: 15 G | Refills: 0 | Status: SHIPPED | OUTPATIENT
Start: 2018-07-24 | End: 2021-04-07

## 2018-07-24 RX ORDER — LISDEXAMFETAMINE DIMESYLATE 20 MG/1
20 CAPSULE ORAL DAILY
Qty: 30 CAPSULE | Refills: 0 | Status: SHIPPED | OUTPATIENT
Start: 2018-07-24 | End: 2018-08-23

## 2018-07-24 RX ORDER — LISDEXAMFETAMINE DIMESYLATE 20 MG/1
20 CAPSULE ORAL DAILY
Qty: 30 CAPSULE | Refills: 0 | Status: SHIPPED | OUTPATIENT
Start: 2018-09-24 | End: 2018-10-24

## 2018-07-24 RX ORDER — LISDEXAMFETAMINE DIMESYLATE 20 MG/1
20 CAPSULE ORAL DAILY
Qty: 30 CAPSULE | Refills: 0 | Status: SHIPPED | OUTPATIENT
Start: 2018-08-24 | End: 2018-09-23

## 2018-07-24 RX ORDER — LISDEXAMFETAMINE DIMESYLATE 20 MG/1
20 CAPSULE ORAL DAILY
Qty: 30 CAPSULE | Refills: 0 | Status: CANCELLED | OUTPATIENT
Start: 2018-07-24

## 2018-07-24 NOTE — LETTER
[facility]    07/24/18    Patient: Gabriela Hall  YOB: 1995  Medical Record Number: [id number]                                                                  Controlled Substance Agreement  I understand that my care provider has prescribed controlled substances (narcotics, tranquilizers, and/or stimulants) to help manage my condition(s).  I am taking this medicine to help me function or work.  I know that this is strong medicine.  It could have serious side effects and even cause a dependency on the drug.  If I stop these medicines suddenly, I could have severe withdrawal symptoms.    The risks, benefits, and side effects of these medication(s) were explained to me.  I agree that:  1. I will take part in other treatments as advised by my provider.  This may be psychiatry or counseling, physical therapy, behavioral therapy, group treatment, or a referral to a pain clinic.  I will reduce or stop my medicine when my provider tells me to do so.   2. I will take my medicines as prescribed.  I will not change the dose or schedule unless my provider tells me to.  There will be no refills if I  run out early.   I may be contacted at any time without warning and asked to complete a drug test or pill count.   3. I will keep all my appointments at the clinic.  If I miss appointments or fail to follow instructions, my provider may stop my medicine.  4. I will not ask other providers to prescribe controlled substances. And I will not accept controlled substances from other people. If I need another prescribed controlled substance for a new reason, I will notify my provider within one business day.  5. If I enroll in the [place] Medical Marijuana program, I will tell my provider.  I will also sign an agreement to share my medical records with my provider.  6. I will use one pharmacy to fill all of my controlled substance prescriptions.  If my prescription is mailed to my pharmacy, it may  take 5 to 7 days for my medicine to be ready.  7. I understand that my provider, clinic care team, and pharmacy can track controlled substance prescriptions from other providers through a central database (prescription monitoring program).  8. I will bring in my list of medications (or my medicine bottles) each time I come to the clinic.  [id number] REV-  07/2018                                                                                                                                   Page 1 of 2      [facility]    07/24/18    Patient: Gabriela Hall  YOB: 1995  Medical Record Number: [id number]    9. Refills of controlled substances will be made only during office hours.  It is up to me to make sure that I do not run out of my medicines on weekends or holidays.    10. I am responsible for my prescriptions.  If the medicine/prescription is lost or stolen, it will not be replaced.   I also agree not to share these medicines with anyone.  11. I agree to not use ANY illegal or recreational drugs.  This includes marijuana, cocaine, bath salts or other drugs.  I agree not to use alcohol unless my provider says I may.  I agree to give urine samples whenever asked.  If I fail to give a urine sample, the provider may stop my medicine.     12. I will tell my nurse or provider right away if I become pregnant or have a new medical problem treated outside of [facility].  13. I understand that this medicine can affect my thinking and judgment.  It may be unsafe for me to drive, use machinery and do dangerous tasks.  I will not do any of these things until I know how the medicine affects me.  If my dose changes, I will wait to see how it affects me.  I will contact my provider if I have concerns about medicine side effects.  I understand that if I do not follow any of the conditions above, my prescriptions or treatment may be stopped.    I agree that my provider, clinic care team, and  pharmacy may work with any city, state or federal law enforcement agency that investigates the misuse, sale, or other diversion of my controlled medicine. I will allow my provider to discuss my care with or share a copy of this agreement with any other treating provider, pharmacy or emergency room where I receive care.  I agree to give up (waive) any right of privacy or confidentiality with respect to these authorizations.   I have read this agreement and have asked questions about anything I did not understand.   ___________________________________    ___________________________  Patient Signature                                                           Date and Time  ___________________________________     ____________________________  Witness                                                                            Date and Time  ___________________________________  JOI Andres CNP  [id number] REV-  07/2018                                                                                                                                                   Page 2 of 2

## 2018-07-24 NOTE — MR AVS SNAPSHOT
After Visit Summary   7/24/2018    Gabriela Hall    MRN: [id number]           Patient Information     Date Of Birth          1995        Visit Information        Provider Department      7/24/2018 10:20 AM Elva Davalos APRN CNP [facility]        Today's Diagnoses     Attention deficit hyperactivity disorder (ADHD), other type    -  1    Acne vulgaris        Screen for STD (sexually transmitted disease)        Need for HPV vaccination           Follow-ups after your visit        Future tests that were ordered for you today     Open Standing Orders        Priority Remaining Interval Expires Ordered    HUMAN PAPILLOMAVIRUS VACCINE Routine 3/3 Monthly 7/24/2019 7/24/2018            Who to contact     If you have questions or need follow up information about today's clinic visit or your schedule please contact [facility] directly at [phone].  Normal or non-critical lab and imaging results will be communicated to you by MyChart, letter or phone within 4 business days after the clinic has received the results. If you do not hear from us within 7 days, please contact the clinic through [employer] or phone. If you have a critical or abnormal lab result, we will notify you by phone as soon as possible.  Submit refill requests through [facility] or call your pharmacy and they will forward the refill request to us. Please allow 3 business days for your refill to be completed.          Additional Information About Your Visit        MyChart Information     [facility] gives you secure access to your electronic health record. If you see a primary care provider, you can also send messages to your care team and make appointments. If you have questions, please call your primary care clinic.  If you do not have a primary care provider, please call [phone] and they will assist you.        Care EveryWhere ID     This is your Care EveryWhere ID. This could be used  by other organizations to access your [place] medical records  [id number]        Your Vitals Were     Pulse Temperature Respirations Pulse Oximetry BMI (Body Mass Index)       83 100.2  F (37.9  C) (Oral) 19 99% 21.99 kg/m2        Blood Pressure from Last 3 Encounters:   07/24/18 110/68   12/12/17 126/73   10/04/17 122/76    Weight from Last 3 Encounters:   07/24/18 140 lb 6.4 oz (63.7 kg)   12/12/17 157 lb (71.2 kg)   10/04/17 156 lb (70.8 kg)              We Performed the Following     Chlamydia trachomatis PCR     Drug Abuse Screen Panel 13, Urine (Pain Care Package)     Neisseria gonorrhoeae PCR          Today's Medication Changes          These changes are accurate as of 7/24/18 11:21 AM.  If you have any questions, ask your nurse or doctor.               These medicines have changed or have updated prescriptions.        Dose/Directions    * lisdexamfetamine 20 MG capsule   Commonly known as:  VYVANSE   This may have changed:  Another medication with the same name was added. Make sure you understand how and when to take each.   Used for:  Attention deficit hyperactivity disorder (ADHD), other type   Changed by:  Elva Davalos APRN CNP        Dose:  20 mg   Take 1 capsule (20 mg) by mouth daily   Quantity:  30 capsule   Refills:  0       * lisdexamfetamine 20 MG capsule   Commonly known as:  VYVANSE   This may have changed:  You were already taking a medication with the same name, and this prescription was added. Make sure you understand how and when to take each.   Used for:  Attention deficit hyperactivity disorder (ADHD), other type   Changed by:  Elva Davalos APRN CNP        Dose:  20 mg   Take 1 capsule (20 mg) by mouth daily   Quantity:  30 capsule   Refills:  0       * lisdexamfetamine 20 MG capsule   Commonly known as:  VYVANSE   This may have changed:  You were already taking a medication with the same name, and this prescription was added. Make sure you understand how and when  to take each.   Used for:  Attention deficit hyperactivity disorder (ADHD), other type   Changed by:  Elva Davalos APRN CNP        Dose:  20 mg   Start taking on:  8/24/2018   Take 1 capsule (20 mg) by mouth daily   Quantity:  30 capsule   Refills:  0       * lisdexamfetamine 20 MG capsule   Commonly known as:  VYVANSE   This may have changed:  You were already taking a medication with the same name, and this prescription was added. Make sure you understand how and when to take each.   Used for:  Attention deficit hyperactivity disorder (ADHD), other type   Changed by:  Elva Davalos APRN CNP        Dose:  20 mg   Start taking on:  9/24/2018   Take 1 capsule (20 mg) by mouth daily   Quantity:  30 capsule   Refills:  0       * Notice:  This list has 4 medication(s) that are the same as other medications prescribed for you. Read the directions carefully, and ask your doctor or other care provider to review them with you.         Where to get your medicines      These medications were sent to [facility] - [place], [place] - [address]  [address], [place] [address]     Phone:  [phone]     clobetasol 0.05 % cream         Some of these will need a paper prescription and others can be bought over the counter.  Ask your nurse if you have questions.     Bring a paper prescription for each of these medications     lisdexamfetamine 20 MG capsule    lisdexamfetamine 20 MG capsule    lisdexamfetamine 20 MG capsule                Primary Care Provider Office Phone # Fax #    JOI Dias [facility] [phone]       [address] [place] [address]        Equal Access to Services     BENITO MCDERMOTT : Hadii vargas jiménez Soalise, waaxda luqadaha, qaybta kaalmada adeegyada, bee martinez. So [facility] [phone].    ATENCIÓN: Si habla español, tiene a portillo disposición servicios gratuitos de asistencia lingüística.  Carol hollis [phone].    We comply with applicable federal civil rights laws and [place] laws. We do not discriminate on the basis of race, color, national origin, age, disability, sex, sexual orientation, or gender identity.            Thank you!     Thank you for choosing [facility]  for your care. Our goal is always to provide you with excellent care. Hearing back from our patients is one way we can continue to improve our services. Please take a few minutes to complete the written survey that you may receive in the mail after your visit with us. Thank you!             Your Updated Medication List - Protect others around you: Learn how to safely use, store and throw away your medicines at www.disposemymeds.org.          This list is accurate as of 7/24/18 11:21 AM.  Always use your most recent med list.                   Brand Name Dispense Instructions for use Diagnosis    clindamycin 1 % lotion    CLEOCIN T    60 mL    Apply topically 2 times daily    Acne vulgaris       clobetasol 0.05 % cream    TEMOVATE    15 g    Apply topically 2 times daily Use on individual acne lesions for no more then 4 days    Acne vulgaris       desonide 0.05 % cream    DESOWEN    60 g    Apply to dry inflamed skin around lips at night    Cheilitis       ketoconazole 2 % cream    NIZORAL    60 g    Apply to dry inflamed skin around lips every morning        * lisdexamfetamine 20 MG capsule    VYVANSE    30 capsule    Take 1 capsule (20 mg) by mouth daily    Attention deficit hyperactivity disorder (ADHD), other type       * lisdexamfetamine 20 MG capsule    VYVANSE    30 capsule    Take 1 capsule (20 mg) by mouth daily    Attention deficit hyperactivity disorder (ADHD), other type       * lisdexamfetamine 20 MG capsule   Start taking on:  8/24/2018    VYVANSE    30 capsule    Take 1 capsule (20 mg) by mouth daily    Attention deficit hyperactivity disorder (ADHD), other type       * lisdexamfetamine 20 MG  capsule   Start taking on:  9/24/2018    VYVANSE    30 capsule    Take 1 capsule (20 mg) by mouth daily    Attention deficit hyperactivity disorder (ADHD), other type       tretinoin 0.025 % cream    RETIN-A    45 g    Apply topically At Bedtime    Acne vulgaris       * Notice:  This list has 4 medication(s) that are the same as other medications prescribed for you. Read the directions carefully, and ask your doctor or other care provider to review them with you.

## 2018-07-24 NOTE — PROGRESS NOTES
SUBJECTIVE:   Gabriela Hall is a 22 year old female who presents to clinic today for the following health issues:        Medication Followup of  Vyvanse    Taking Medication as prescribed: yes    Side Effects:  None    Medication Helping Symptoms:  yes   Last dose of vyvanse this am.  She is taking her medication as prescribed.  She denies any significant side effects.  Her Vyvanse was reduced from 30 mg a day to 20 with her last refill.  She has felt that this was a appropriate dose change and she is doing just fine on the 20 mg a day.  She is requesting a 3 month refill of the 20 mg tablets.    Dermatology:  Dr. Chong.    Weight loss:  Intentional.  She changed her eating habits.  Less fat and calories.  Feeling better.    HPV vaccine:  Third and final vaccine due today.    Problem list and histories reviewed & adjusted, as indicated.  Additional history: as documented    Patient Active Problem List   Diagnosis     Acne     Controlled substance agreement signed     Attention deficit hyperactivity disorder (ADHD), other type     Situational anxiety     Past Surgical History:   Procedure Laterality Date     NO HISTORY OF SURGERY         Social History   Substance Use Topics     Smoking status: Never Smoker     Smokeless tobacco: Never Used     Alcohol use Yes      Comment: socially     Family History   Problem Relation Age of Onset     Respiratory Maternal Grandmother      Alzheimer Disease Paternal Grandmother      HEART DISEASE Paternal Grandfather      Cancer Paternal Grandfather      ?melanoma--precancer     Psychotic Disorder Brother      ADHD     Skin Cancer No family hx of      no skin cancer         Current Outpatient Prescriptions   Medication Sig Dispense Refill     clindamycin (CLEOCIN T) 1 % lotion Apply topically 2 times daily 60 mL 6     clobetasol (TEMOVATE) 0.05 % cream Apply topically 2 times daily Use on individual acne lesions for no more then 4 days 15 g 0     desonide (DESOWEN) 0.05 %  cream Apply to dry inflamed skin around lips at night 60 g 2     ketoconazole (NIZORAL) 2 % cream Apply to dry inflamed skin around lips every morning 60 g 2     lisdexamfetamine (VYVANSE) 20 MG capsule Take 1 capsule (20 mg) by mouth daily 30 capsule 0     tretinoin (RETIN-A) 0.025 % cream Apply topically At Bedtime 45 g 5     Allergies   Allergen Reactions     Amoxil [Amoxicillin] Rash     No lab results found.   BP Readings from Last 3 Encounters:   07/24/18 110/68   12/12/17 126/73   10/04/17 122/76    Wt Readings from Last 3 Encounters:   07/24/18 140 lb 6.4 oz (63.7 kg)   12/12/17 157 lb (71.2 kg)   10/04/17 156 lb (70.8 kg)                  Labs reviewed in EPIC    Reviewed and updated as needed this visit by clinical staff       Reviewed and updated as needed this visit by Provider         ROS:  Constitutional, HEENT, cardiovascular, pulmonary, GI, , musculoskeletal, neuro, skin, endocrine and psych systems are negative, except as otherwise noted.    OBJECTIVE:     /68  Pulse 83  Temp 100.2  F (37.9  C) (Oral)  Resp 19  Wt 140 lb 6.4 oz (63.7 kg)  SpO2 99%  BMI 21.99 kg/m2  Body mass index is 21.99 kg/(m^2).  GENERAL APPEARANCE: healthy, alert and no distress. Smiling.   SKIN: warm and dry  PSYCH: mentation appears normal and affect normal/bright.  Good eye contact.    ASSESSMENT/PLAN:     (F90.8) Attention deficit hyperactivity disorder (ADHD), other type  (primary encounter diagnosis)  Comment:   Plan: lisdexamfetamine (VYVANSE) 20 MG capsule,         lisdexamfetamine (VYVANSE) 20 MG capsule,         lisdexamfetamine (VYVANSE) 20 MG capsule, Drug         Abuse Screen Panel 13, Urine (Pain Care         Package)        Gabriela has been compliant with medication refill necessities such as face-to-face appointments alternating with the visit for refills.  She takes her medication  Regularly and appropriately.  We did sign a controlled substance agreement today.  The letter scanned into her chart.   She will plan to do an E visit in 3 months for her next set of refills.  She is to return to the clinic sooner or to see me as needed with other concerns.  Questions were answered.        (L70.0) Acne vulgaris  Comment:   Plan: clobetasol (TEMOVATE) 0.05 % cream        Continue care with dermatology.  Refill was granted.    (Z11.3) Screen for STD (sexually transmitted disease)  Comment: routine  Plan: Chlamydia trachomatis PCR, Neisseria         gonorrhoeae PCR        Urine test for STD screening was done today.    (Z23) Need for HPV vaccination  Comment:   Plan: HUMAN PAPILLOMAVIRUS VACCINE      Third and final vaccine given today.      I spent a total of 30 min with the patient, >50% time spent face to face counseling regarding the above issues, establishing a plan of care, and coordinating follow up care.     JOI Andres [facility]

## 2018-07-25 LAB
C TRACH DNA SPEC QL NAA+PROBE: NEGATIVE
N GONORRHOEA DNA SPEC QL NAA+PROBE: NEGATIVE
SPECIMEN SOURCE: NORMAL
SPECIMEN SOURCE: NORMAL

## 2018-09-14 ENCOUNTER — OFFICE VISIT (OUTPATIENT)
Dept: FAMILY MEDICINE | Facility: CLINIC | Age: 23
End: 2018-09-14
Payer: COMMERCIAL

## 2018-09-14 VITALS
BODY MASS INDEX: 21.99 KG/M2 | TEMPERATURE: 98.4 F | OXYGEN SATURATION: 100 % | HEART RATE: 77 BPM | DIASTOLIC BLOOD PRESSURE: 62 MMHG | SYSTOLIC BLOOD PRESSURE: 110 MMHG | WEIGHT: 140.4 LBS

## 2018-09-14 DIAGNOSIS — F90.8 ATTENTION DEFICIT HYPERACTIVITY DISORDER (ADHD), OTHER TYPE: ICD-10-CM

## 2018-09-14 DIAGNOSIS — F41.8 SITUATIONAL ANXIETY: ICD-10-CM

## 2018-09-14 DIAGNOSIS — F32.0 MAJOR DEPRESSIVE DISORDER, SINGLE EPISODE, MILD (H): Primary | ICD-10-CM

## 2018-09-14 PROCEDURE — 99214 OFFICE O/P EST MOD 30 MIN: CPT | Performed by: NURSE PRACTITIONER

## 2018-09-14 RX ORDER — BUPROPION HYDROCHLORIDE 150 MG/1
150 TABLET ORAL EVERY MORNING
Qty: 30 TABLET | Refills: 1 | Status: SHIPPED | OUTPATIENT
Start: 2018-09-14 | End: 2018-10-31

## 2018-09-14 ASSESSMENT — ANXIETY QUESTIONNAIRES
5. BEING SO RESTLESS THAT IT IS HARD TO SIT STILL: MORE THAN HALF THE DAYS
GAD7 TOTAL SCORE: 16
1. FEELING NERVOUS, ANXIOUS, OR ON EDGE: MORE THAN HALF THE DAYS
IF YOU CHECKED OFF ANY PROBLEMS ON THIS QUESTIONNAIRE, HOW DIFFICULT HAVE THESE PROBLEMS MADE IT FOR YOU TO DO YOUR WORK, TAKE CARE OF THINGS AT HOME, OR GET ALONG WITH OTHER PEOPLE: VERY DIFFICULT
7. FEELING AFRAID AS IF SOMETHING AWFUL MIGHT HAPPEN: MORE THAN HALF THE DAYS
6. BECOMING EASILY ANNOYED OR IRRITABLE: NEARLY EVERY DAY
3. WORRYING TOO MUCH ABOUT DIFFERENT THINGS: NEARLY EVERY DAY
2. NOT BEING ABLE TO STOP OR CONTROL WORRYING: SEVERAL DAYS

## 2018-09-14 ASSESSMENT — PATIENT HEALTH QUESTIONNAIRE - PHQ9: 5. POOR APPETITE OR OVEREATING: NEARLY EVERY DAY

## 2018-09-14 NOTE — PROGRESS NOTES
"  SUBJECTIVE:   Gabriela Hall is a 22 year old female who presents to clinic today for the following health issues:  Chief Complaint   Patient presents with     Anxiety     Depression         Depression and Anxiety Follow-Up    Status since last visit: Worsened     Other associated symptoms:None    Complicating factors:     Significant life event: Yes-  Life changes she is aware that it is something  That you go through but she hasnt reacted to it as well as she had anticipated     Current substance abuse: None    PHQ-9 12/2/2015 10/4/2017 9/14/2018   Total Score 7 8 19   Q9: Suicide Ideation Not at all Not at all Several days     ODELL-7 SCORE 10/4/2017 9/14/2018   Total Score 9 16     Patient denies active suicidal thoughts.  PHQ-9  English  PHQ-9   Any Language  ODELL-7  Suicide Assessment Five-step Evaluation and Treatment (SAFE-T)    Amount of exercise or physical activity: 2-3 days/week for an average of 45-60 minutes    Problems taking medications regularly: No    Medication side effects: none    Diet: regular (no restrictions)    When Gabriela was in 8th or 9th grade, she was seen for mood issue and ADHD.  She went to counseling.  She was told at that time that if she treated her ADHD her mood would improve.    Lack of motivation, energy.  Sleep:either sleeping too much or 5-6 hours per night.  Restless sleep.    New job.  Big expectations.  \"I know I wasn't giving it my all.\"    Excessive worry.  Sometimes worry about social interactions.  \"i'm an overthinker.\"    Dad: untreated anxiety.  Brother: on antidepressants  Younger sister: anxiety    Anxiety/depression: she feels that the depression is worse, fatigued, \"tired.\"    Problem list and histories reviewed & adjusted, as indicated.  Additional history: as documented    Patient Active Problem List   Diagnosis     Acne     Controlled substance agreement signed     Attention deficit hyperactivity disorder (ADHD), other type     Situational anxiety     Past " Surgical History:   Procedure Laterality Date     NO HISTORY OF SURGERY         Social History   Substance Use Topics     Smoking status: Never Smoker     Smokeless tobacco: Never Used     Alcohol use Yes      Comment: socially     Family History   Problem Relation Age of Onset     Respiratory Maternal Grandmother      Alzheimer Disease Paternal Grandmother      HEART DISEASE Paternal Grandfather      Cancer Paternal Grandfather      ?melanoma--precancer     Psychotic Disorder Brother      ADHD     Skin Cancer No family hx of      no skin cancer         Current Outpatient Prescriptions   Medication Sig Dispense Refill     clindamycin (CLEOCIN T) 1 % lotion Apply topically 2 times daily 60 mL 6     clobetasol (TEMOVATE) 0.05 % cream Apply topically 2 times daily Use on individual acne lesions for no more then 4 days 15 g 0     desonide (DESOWEN) 0.05 % cream Apply to dry inflamed skin around lips at night 60 g 2     ketoconazole (NIZORAL) 2 % cream Apply to dry inflamed skin around lips every morning 60 g 2     lisdexamfetamine (VYVANSE) 20 MG capsule Take 1 capsule (20 mg) by mouth daily 30 capsule 0     [START ON 9/24/2018] lisdexamfetamine (VYVANSE) 20 MG capsule Take 1 capsule (20 mg) by mouth daily 30 capsule 0     lisdexamfetamine (VYVANSE) 20 MG capsule Take 1 capsule (20 mg) by mouth daily 30 capsule 0     tretinoin (RETIN-A) 0.025 % cream Apply topically At Bedtime 45 g 5     Allergies   Allergen Reactions     Amoxil [Amoxicillin] Rash     No lab results found.   BP Readings from Last 3 Encounters:   09/14/18 110/62   07/24/18 110/68   12/12/17 126/73    Wt Readings from Last 3 Encounters:   09/14/18 140 lb 6.4 oz (63.7 kg)   07/24/18 140 lb 6.4 oz (63.7 kg)   12/12/17 157 lb (71.2 kg)            Labs reviewed in EPIC    Reviewed and updated as needed this visit by clinical staff       Reviewed and updated as needed this visit by Provider         ROS:  Constitutional, HEENT, cardiovascular, pulmonary, gi and  gu systems are negative, except as otherwise noted.    OBJECTIVE:     /62  Pulse 77  Temp 98.4  F (36.9  C) (Oral)  Wt 140 lb 6.4 oz (63.7 kg)  SpO2 100%  BMI 21.99 kg/m2  Body mass index is 21.99 kg/(m^2).  GENERAL APPEARANCE: healthy, alert and no distress. Smiling.   SKIN: warm and dry  PSYCH: mentation appears normal and affect normal/bright.  Good eye contact.  She offers information freely.      ASSESSMENT/PLAN:     (F32.0) Major depressive disorder, single episode, mild (H)  (primary encounter diagnosis)  Comment: Worse  Plan: buPROPion (WELLBUTRIN XL) 150 MG 24 hr tablet       .  I did discuss with the patient that sounds to me like she does have worsening depression at this time.  Since she is not having a lot of ruminating and a lot of her issues are related to energy and mood, I would like her to go ahead and try the Wellbutrin.  I did discuss with her potential side effects, how to take the medication, its effect on dopamine etc.  She is to take 1 tablet per day.  She is due for a face-to-face appointment for a follow-up regarding her Vyvanse in October.  She will continue the Wellbutrin at this time and she will return to the clinic to see me when she is due Vyvanse refills.  At that appointment I will also refill her Wellbutrin.   If the Wellbutrin is not going well, I would consider a different medication and/or dose adjustment.  The patient verbalizes understanding.  She will monitor for worsening mood.  She will consider a psychotherapist, but she declined that referral today    (F41.8) Situational anxiety  Comment:   Plan: as above    (F90.8) Attention deficit hyperactivity disorder (ADHD), other type  Comment:   Plan: She is to continue her Vyvanse as prescribed and return to the clinic in October 2018 for her next check in and refills.    I spent a total of 30 min with the patient, >50% time spent face to face counseling regarding the above issues, establishing a plan of care, and  coordinating follow up care.       JOI Andres [facility] she offers information freely.

## 2018-09-14 NOTE — PROGRESS NOTES
"  SUBJECTIVE:   Gabriela Hall is a 22 year old female who presents to clinic today for the following health issues:  {Provider please address medication reconciliation discrepancies--rooming staff please delete if no med/rec issues}    Anxiety Follow-Up    Status since last visit: { :311811::\"No change\"}    Other associated symptoms:{ :076318::\"None\"}    Complicating factors:   Significant life event: { :798593::\"No\"}   Current substance abuse: { :380673::\"None\"}  Depression symptoms: { :876110::\"No\"}  ODELL-7 SCORE 10/4/2017   Total Score 9       ODELL-7    Amount of exercise or physical activity: {Exercise frequency days per week:973488}    Problems taking medications regularly: {Med Problems:141699::\"No\"}    Medication side effects: {CHRONIC MED SIDE EFFECTS:909579::\"none\"}    Diet: { :203226}        {additional problems for provider to add:593852}    Problem list and histories reviewed & adjusted, as indicated.  Additional history: {NONE - AS DOCUMENTED:593966::\"as documented\"}    {HIST REVIEW/ LINKS 2:063487}    Reviewed and updated as needed this visit by clinical staff       Reviewed and updated as needed this visit by Provider         {PROVIDER CHARTING PREFERENCE:327393}  "

## 2018-09-14 NOTE — PATIENT INSTRUCTIONS
Depression Affects Your Mind and Body  Everyone feels sad or  blue  from time to time for a few days or weeks. Depression is when these feelings don't go away and they interfere with daily life.  Depression is a real illness that can develop at any age. It is one of the most common mental health problems in the [place] Depression makes you feel sad, helpless, and hopeless. It gets in the way of your life and relationships. It inhibits your ability to think and act. But, with help, you can feel better again.      When I was depressed, I felt awful. I was so tired all the time I could hardly think, but at night I couldn t fall asleep. My head hurt. My stomach hurt. I didn t know what was wrong with me.    Depression affects your whole body  Brain chemicals affect your body as well as your mood. So depression may do more than just make you feel low. You may also feel bad physically. Depression can:    Cause trouble with mental tasks such as remembering, concentrating, or making decisions    Make you feel nervous and jumpy    Cause trouble sleeping. Or you may sleep too much    Change your appetite    Cause headaches, stomachaches, or other aches and pains    Drain your body of energy  Depression and other illness  It is common for people who have chronic health problems to also have depression. It can often be hard to tell which one caused the other. A person might become depressed after finding out they have a health problem. But some studies suggest being depressed may make certain health problems more likely. And some depressed people stop taking care of themselves. This may make them more likely to get sick.  Date Last Reviewed: 1/1/2017 2000-2017 [employer]. [address] [place] [address], [place] [address]. All rights reserved. This information is not intended as a substitute for professional medical care. Always follow your healthcare professional's instructions.

## 2018-09-14 NOTE — MR AVS SNAPSHOT
After Visit Summary   9/14/2018    Gabriela Hall    MRN: [id number]           Patient Information     Date Of Birth          1995        Visit Information        Provider Department      9/14/2018 9:20 AM Elva Davalos APRN [facility]        Today's Diagnoses     Major depressive disorder, single episode, mild (H)    -  1    Situational anxiety        Attention deficit hyperactivity disorder (ADHD), other type          Care Instructions      Depression Affects Your Mind and Body  Everyone feels sad or  blue  from time to time for a few days or weeks. Depression is when these feelings don't go away and they interfere with daily life.  Depression is a real illness that can develop at any age. It is one of the most common mental health problems in the [place] Depression makes you feel sad, helpless, and hopeless. It gets in the way of your life and relationships. It inhibits your ability to think and act. But, with help, you can feel better again.      When I was depressed, I felt awful. I was so tired all the time I could hardly think, but at night I couldn t fall asleep. My head hurt. My stomach hurt. I didn t know what was wrong with me.    Depression affects your whole body  Brain chemicals affect your body as well as your mood. So depression may do more than just make you feel low. You may also feel bad physically. Depression can:    Cause trouble with mental tasks such as remembering, concentrating, or making decisions    Make you feel nervous and jumpy    Cause trouble sleeping. Or you may sleep too much    Change your appetite    Cause headaches, stomachaches, or other aches and pains    Drain your body of energy  Depression and other illness  It is common for people who have chronic health problems to also have depression. It can often be hard to tell which one caused the other. A person might become depressed after finding out they have a health problem.  But some studies suggest being depressed may make certain health problems more likely. And some depressed people stop taking care of themselves. This may make them more likely to get sick.  Date Last Reviewed: 1/1/2017 2000-2017 [employer]. [address] [place] [address], [place] [address]. All rights reserved. This information is not intended as a substitute for professional medical care. Always follow your healthcare professional's instructions.                Follow-ups after your visit        Who to contact     If you have questions or need follow up information about today's clinic visit or your schedule please contact [facility] directly at [phone].  Normal or non-critical lab and imaging results will be communicated to you by MyChart, letter or phone within 4 business days after the clinic has received the results. If you do not hear from us within 7 days, please contact the clinic through [email or web address] or phone. If you have a critical or abnormal lab result, we will notify you by phone as soon as possible.  Submit refill requests through [employer] or call your pharmacy and they will forward the refill request to us. Please allow 3 business days for your refill to be completed.          Additional Information About Your Visit        [email or web address] Information     [employer] gives you secure access to your electronic health record. If you see a primary care provider, you can also send messages to your care team and make appointments. If you have questions, please call your primary care clinic.  If you do not have a primary care provider, please call [phone] and they will assist you.        Care EveryWhere ID     This is your Care EveryWhere ID. This could be used by other organizations to access your [facility] medical records  [id number]        Your Vitals Were     Pulse Temperature Pulse Oximetry BMI (Body Mass Index)          77 98.4  F (36.9  C) (Oral) 100% 21.99 kg/m2         Blood  Pressure from Last 3 Encounters:   09/14/18 110/62   07/24/18 110/68   12/12/17 126/73    Weight from Last 3 Encounters:   09/14/18 140 lb 6.4 oz (63.7 kg)   07/24/18 140 lb 6.4 oz (63.7 kg)   12/12/17 157 lb (71.2 kg)              Today, you had the following     No orders found for display         Today's Medication Changes          These changes are accurate as of 9/14/18  9:58 AM.  If you have any questions, ask your nurse or doctor.               Start taking these medicines.        Dose/Directions    buPROPion 150 MG 24 hr tablet   Commonly known as:  WELLBUTRIN XL   Used for:  Major depressive disorder, single episode, mild (H)   Started by:  Elva Davalos APRN CNP        Dose:  150 mg   Take 1 tablet (150 mg) by mouth every morning   Quantity:  30 tablet   Refills:  1            Where to get your medicines      These medications were sent to [facility] - [place], [place] - [address]  [address], [place] [address]     Phone:  [phone]     buPROPion 150 MG 24 hr tablet                Primary Care Provider Office Phone # Fax #    JOI Dias [phone]       [address] [place] [address]        Equal Access to Services     BENITO MCDERMOTT AH: Hadii vargas ku hadasho Soomaali, waaxda luqadaha, qaybta kaalmada adeegyada, bee martinez. So [facility] [phone].    ATENCIÓN: Si habla español, tiene a portillo disposición servicios gratuitos de asistencia lingüística. Carol al [phone].    We comply with applicable federal civil rights laws and [place] laws. We do not discriminate on the basis of race, color, national origin, age, disability, sex, sexual orientation, or gender identity.            Thank you!     Thank you for choosing [facility]  for your care. Our goal is always to provide you with excellent care. Hearing back from our patients is one way we can continue to improve  our services. Please take a few minutes to complete the written survey that you may receive in the mail after your visit with us. Thank you!             Your Updated Medication List - Protect others around you: Learn how to safely use, store and throw away your medicines at www.disposemymeds.org.          This list is accurate as of 9/14/18  9:58 AM.  Always use your most recent med list.                   Brand Name Dispense Instructions for use Diagnosis    buPROPion 150 MG 24 hr tablet    WELLBUTRIN XL    30 tablet    Take 1 tablet (150 mg) by mouth every morning    Major depressive disorder, single episode, mild (H)       clindamycin 1 % lotion    CLEOCIN T    60 mL    Apply topically 2 times daily    Acne vulgaris       clobetasol 0.05 % cream    TEMOVATE    15 g    Apply topically 2 times daily Use on individual acne lesions for no more then 4 days    Acne vulgaris       desonide 0.05 % cream    DESOWEN    60 g    Apply to dry inflamed skin around lips at night    Cheilitis       ketoconazole 2 % cream    NIZORAL    60 g    Apply to dry inflamed skin around lips every morning        * lisdexamfetamine 20 MG capsule    VYVANSE    30 capsule    Take 1 capsule (20 mg) by mouth daily    Attention deficit hyperactivity disorder (ADHD), other type       * lisdexamfetamine 20 MG capsule    VYVANSE    30 capsule    Take 1 capsule (20 mg) by mouth daily    Attention deficit hyperactivity disorder (ADHD), other type       * lisdexamfetamine 20 MG capsule   Start taking on:  9/24/2018    VYVANSE    30 capsule    Take 1 capsule (20 mg) by mouth daily    Attention deficit hyperactivity disorder (ADHD), other type       tretinoin 0.025 % cream    RETIN-A    45 g    Apply topically At Bedtime    Acne vulgaris       * Notice:  This list has 3 medication(s) that are the same as other medications prescribed for you. Read the directions carefully, and ask your doctor or other care provider to review them with you.

## 2018-09-15 ASSESSMENT — ANXIETY QUESTIONNAIRES: GAD7 TOTAL SCORE: 16

## 2018-09-15 ASSESSMENT — PATIENT HEALTH QUESTIONNAIRE - PHQ9: SUM OF ALL RESPONSES TO PHQ QUESTIONS 1-9: 19

## 2018-10-31 ENCOUNTER — OFFICE VISIT (OUTPATIENT)
Dept: FAMILY MEDICINE | Facility: CLINIC | Age: 23
End: 2018-10-31
Payer: COMMERCIAL

## 2018-10-31 VITALS
WEIGHT: 140 LBS | HEART RATE: 77 BPM | DIASTOLIC BLOOD PRESSURE: 66 MMHG | RESPIRATION RATE: 18 BRPM | OXYGEN SATURATION: 97 % | TEMPERATURE: 98.4 F | BODY MASS INDEX: 21.93 KG/M2 | SYSTOLIC BLOOD PRESSURE: 109 MMHG

## 2018-10-31 DIAGNOSIS — F32.0 MAJOR DEPRESSIVE DISORDER, SINGLE EPISODE, MILD (H): ICD-10-CM

## 2018-10-31 DIAGNOSIS — F90.8 ATTENTION DEFICIT HYPERACTIVITY DISORDER (ADHD), OTHER TYPE: Primary | ICD-10-CM

## 2018-10-31 PROCEDURE — 99213 OFFICE O/P EST LOW 20 MIN: CPT | Performed by: NURSE PRACTITIONER

## 2018-10-31 RX ORDER — LISDEXAMFETAMINE DIMESYLATE 20 MG/1
20 CAPSULE ORAL DAILY
Qty: 30 CAPSULE | Refills: 0 | Status: SHIPPED | OUTPATIENT
Start: 2018-12-01 | End: 2018-12-31

## 2018-10-31 RX ORDER — LISDEXAMFETAMINE DIMESYLATE 20 MG/1
20 CAPSULE ORAL DAILY
Qty: 30 CAPSULE | Refills: 0 | Status: SHIPPED | OUTPATIENT
Start: 2018-10-31 | End: 2018-11-30

## 2018-10-31 RX ORDER — BUPROPION HYDROCHLORIDE 150 MG/1
150 TABLET ORAL EVERY MORNING
Qty: 90 TABLET | Refills: 3 | Status: SHIPPED | OUTPATIENT
Start: 2018-10-31 | End: 2021-04-07

## 2018-10-31 RX ORDER — LISDEXAMFETAMINE DIMESYLATE 20 MG/1
20 CAPSULE ORAL DAILY
Qty: 30 CAPSULE | Refills: 0 | Status: SHIPPED | OUTPATIENT
Start: 2019-01-01 | End: 2019-01-31

## 2018-10-31 NOTE — PROGRESS NOTES
SUBJECTIVE:   Gabriela Hall is a 22 year old female who presents to clinic today for the following health issues:  Chief Complaint   Patient presents with     Recheck Medication         Medication Followup of Vyvanse    Taking Medication as prescribed: yes    Side Effects:  None    Medication Helping Symptoms:  yes       Gabriela is the clinic today for refills on her Vyvanse and Wellbutrin.  She reports that she is doing great.  She has no current issues or side effects.  She is been taking her medications as prescribed.  She is in a healthy relationship.  She is to take good care of herself with healthy diet, fluids, rest etc. she has no other changes in her history.        Problem list and histories reviewed & adjusted, as indicated.  Additional history: as documented    Patient Active Problem List   Diagnosis     Acne     Controlled substance agreement signed     Attention deficit hyperactivity disorder (ADHD), other type     Situational anxiety     Past Surgical History:   Procedure Laterality Date     NO HISTORY OF SURGERY         Social History   Substance Use Topics     Smoking status: Never Smoker     Smokeless tobacco: Never Used     Alcohol use Yes      Comment: socially     Family History   Problem Relation Age of Onset     Respiratory Maternal Grandmother      Alzheimer Disease Paternal Grandmother      HEART DISEASE Paternal Grandfather      Cancer Paternal Grandfather      ?melanoma--precancer     Psychotic Disorder Brother      ADHD     Skin Cancer No family hx of      no skin cancer         Current Outpatient Prescriptions   Medication Sig Dispense Refill     buPROPion (WELLBUTRIN XL) 150 MG 24 hr tablet Take 1 tablet (150 mg) by mouth every morning 30 tablet 1     clindamycin (CLEOCIN T) 1 % lotion Apply topically 2 times daily 60 mL 6     clobetasol (TEMOVATE) 0.05 % cream Apply topically 2 times daily Use on individual acne lesions for no more then 4 days 15 g 0     desonide (DESOWEN) 0.05 %  cream Apply to dry inflamed skin around lips at night 60 g 2     ketoconazole (NIZORAL) 2 % cream Apply to dry inflamed skin around lips every morning 60 g 2     lisdexamfetamine (VYVANSE) 20 MG capsule Take 1 capsule (20 mg) by mouth daily 30 capsule 0     tretinoin (RETIN-A) 0.025 % cream Apply topically At Bedtime 45 g 5     Allergies   Allergen Reactions     Amoxil [Amoxicillin] Rash     No lab results found.   BP Readings from Last 3 Encounters:   10/31/18 109/66   09/14/18 110/62   07/24/18 110/68    Wt Readings from Last 3 Encounters:   10/31/18 140 lb (63.5 kg)   09/14/18 140 lb 6.4 oz (63.7 kg)   07/24/18 140 lb 6.4 oz (63.7 kg)                  Labs reviewed in EPIC    Reviewed and updated as needed this visit by clinical staff       Reviewed and updated as needed this visit by Provider         ROS:  Constitutional, HEENT, cardiovascular, pulmonary, gi and gu systems are negative, except as otherwise noted.    OBJECTIVE:     /66 (BP Location: Right arm, Patient Position: Sitting, Cuff Size: Adult Regular)  Pulse 77  Temp 98.4  F (36.9  C) (Temporal)  Resp 18  Wt 140 lb (63.5 kg)  SpO2 97%  BMI 21.93 kg/m2  Body mass index is 21.93 kg/(m^2).  Constitutional: healthy, alert and no distress  Neck: supple, no adenopathy  Cardiovascular: RRR. No murmurs, clicks gallops or rub  Respiratory: Respirations easy and regular. No respiratory distress noted. Lung sounds clear to auscultation.  Neurologic: Gait normal. Reflexes normal and symmetric. Sensation grossly WNL.  Psychiatric: mentation appears normal and affect normal/bright        ASSESSMENT/PLAN:     (F90.8) Attention deficit hyperactivity disorder (ADHD), other type  (primary encounter diagnosis)  Comment:   Plan: lisdexamfetamine (VYVANSE) 20 MG capsule,         lisdexamfetamine (VYVANSE) 20 MG capsule,         lisdexamfetamine (VYVANSE) 20 MG capsule            (F32.0) Major depressive disorder, single episode, mild (H)  Comment:   Plan:  buPROPion (WELLBUTRIN XL) 150 MG 24 hr tablet            Refills are given today.  She is to continue her healthy self-cares.  She is to return to clinic to see me for recheck or do an E visit in 3 months for additional refills/  She is to return sooner or let me know if she has any problems.    JOI Andres [facility]

## 2018-10-31 NOTE — MR AVS SNAPSHOT
After Visit Summary   10/31/2018    Gabriela Hall    MRN: [id number]           Patient Information     Date Of Birth          1995        Visit Information        Provider Department      10/31/2018 5:40 PM Elva Davalos APRN CNP [facility]        Today's Diagnoses     Attention deficit hyperactivity disorder (ADHD), other type    -  1    Major depressive disorder, single episode, mild (H)           Follow-ups after your visit        Who to contact     If you have questions or need follow up information about today's clinic visit or your schedule please contact [facility] directly at [phone].  Normal or non-critical lab and imaging results will be communicated to you by MyChart, letter or phone within 4 business days after the clinic has received the results. If you do not hear from us within 7 days, please contact the clinic through [employer] or phone. If you have a critical or abnormal lab result, we will notify you by phone as soon as possible.  Submit refill requests through [employer] or call your pharmacy and they will forward the refill request to us. Please allow 3 business days for your refill to be completed.          Additional Information About Your Visit        MyChart Information     [employer] gives you secure access to your electronic health record. If you see a primary care provider, you can also send messages to your care team and make appointments. If you have questions, please call your primary care clinic.  If you do not have a primary care provider, please call [phone] and they will assist you.        Care EveryWhere ID     This is your Care EveryWhere ID. This could be used by other organizations to access your [place] [facility] medical records  [id number]        Your Vitals Were     Pulse Temperature Respirations Pulse Oximetry BMI (Body Mass Index)       77 98.4  F (36.9  C) (Temporal) 18 97% 21.93 kg/m2        Blood  Pressure from Last 3 Encounters:   10/31/18 109/66   09/14/18 110/62   07/24/18 110/68    Weight from Last 3 Encounters:   10/31/18 140 lb (63.5 kg)   09/14/18 140 lb 6.4 oz (63.7 kg)   07/24/18 140 lb 6.4 oz (63.7 kg)              Today, you had the following     No orders found for display         Today's Medication Changes          These changes are accurate as of 10/31/18 11:59 PM.  If you have any questions, ask your nurse or doctor.               Start taking these medicines.        Dose/Directions    * lisdexamfetamine 20 MG capsule   Commonly known as:  VYVANSE   Used for:  Attention deficit hyperactivity disorder (ADHD), other type   Started by:  Elva Davalos APRN CNP        Dose:  20 mg   Take 1 capsule (20 mg) by mouth daily   Quantity:  30 capsule   Refills:  0       * lisdexamfetamine 20 MG capsule   Commonly known as:  VYVANSE   Used for:  Attention deficit hyperactivity disorder (ADHD), other type   Started by:  Elva Davalos APRN CNP        Dose:  20 mg   Start taking on:  12/1/2018   Take 1 capsule (20 mg) by mouth daily   Quantity:  30 capsule   Refills:  0       * lisdexamfetamine 20 MG capsule   Commonly known as:  VYVANSE   Used for:  Attention deficit hyperactivity disorder (ADHD), other type   Started by:  Elva Davalos APRN CNP        Dose:  20 mg   Start taking on:  1/1/2019   Take 1 capsule (20 mg) by mouth daily   Quantity:  30 capsule   Refills:  0       * Notice:  This list has 3 medication(s) that are the same as other medications prescribed for you. Read the directions carefully, and ask your doctor or other care provider to review them with you.         Where to get your medicines      These medications were sent to [facility] - [place], [place] - [address]  [address], [place] [address]     Phone:  [phone]     buPROPion 150 MG 24 hr tablet         Some of these will need a paper prescription and others can be bought  over the counter.  Ask your nurse if you have questions.     Bring a paper prescription for each of these medications     lisdexamfetamine 20 MG capsule    lisdexamfetamine 20 MG capsule    lisdexamfetamine 20 MG capsule                Primary Care Provider Office Phone # Fax #    JOI Dias [phone] [address] [place] [address]        Equal Access to Services     BENITO MCDERMOTT : Hadii aad ku hadasho Soomaali, waaxda luqadaha, qaybta kaalmada adeegyada, waxay idiin hayaan adeeg kharash lajerry . So [facility] [phone].    ATENCIÓN: Si ewa espsaúl, tiene a portillo disposición servicios gratuitos de asistencia lingüística. Llame al [phone].    We comply with applicable federal civil rights laws and [place] laws. We do not discriminate on the basis of race, color, national origin, age, disability, sex, sexual orientation, or gender identity.            Thank you!     Thank you for choosing [facility]  for your care. Our goal is always to provide you with excellent care. Hearing back from our patients is one way we can continue to improve our services. Please take a few minutes to complete the written survey that you may receive in the mail after your visit with us. Thank you!             Your Updated Medication List - Protect others around you: Learn how to safely use, store and throw away your medicines at www.disposemymeds.org.          This list is accurate as of 10/31/18 11:59 PM.  Always use your most recent med list.                   Brand Name Dispense Instructions for use Diagnosis    buPROPion 150 MG 24 hr tablet    WELLBUTRIN XL    90 tablet    Take 1 tablet (150 mg) by mouth every morning    Major depressive disorder, single episode, mild (H)       clindamycin 1 % lotion    CLEOCIN T    60 mL    Apply topically 2 times daily    Acne vulgaris       clobetasol 0.05 % cream    TEMOVATE    15 g    Apply topically 2 times daily Use  on individual acne lesions for no more then 4 days    Acne vulgaris       desonide 0.05 % cream    DESOWEN    60 g    Apply to dry inflamed skin around lips at night    Cheilitis       ketoconazole 2 % cream    NIZORAL    60 g    Apply to dry inflamed skin around lips every morning        * lisdexamfetamine 20 MG capsule    VYVANSE    30 capsule    Take 1 capsule (20 mg) by mouth daily    Attention deficit hyperactivity disorder (ADHD), other type       * lisdexamfetamine 20 MG capsule   Start taking on:  12/1/2018    VYVANSE    30 capsule    Take 1 capsule (20 mg) by mouth daily    Attention deficit hyperactivity disorder (ADHD), other type       * lisdexamfetamine 20 MG capsule   Start taking on:  1/1/2019    VYVANSE    30 capsule    Take 1 capsule (20 mg) by mouth daily    Attention deficit hyperactivity disorder (ADHD), other type       tretinoin 0.025 % cream    RETIN-A    45 g    Apply topically At Bedtime    Acne vulgaris       * Notice:  This list has 3 medication(s) that are the same as other medications prescribed for you. Read the directions carefully, and ask your doctor or other care provider to review them with you.

## 2018-11-20 ENCOUNTER — MYC MEDICAL ADVICE (OUTPATIENT)
Dept: FAMILY MEDICINE | Facility: CLINIC | Age: 23
End: 2018-11-20

## 2020-02-24 ENCOUNTER — HEALTH MAINTENANCE LETTER (OUTPATIENT)
Age: 25
End: 2020-02-24

## 2020-02-25 ENCOUNTER — TRANSFERRED RECORDS (OUTPATIENT)
Dept: HEALTH INFORMATION MANAGEMENT | Facility: CLINIC | Age: 25
End: 2020-02-25

## 2020-02-25 LAB
HPV ABSTRACT: NORMAL
PAP SMEAR - HIM PATIENT REPORTED: NEGATIVE

## 2020-07-24 ENCOUNTER — TRANSFERRED RECORDS (OUTPATIENT)
Dept: HEALTH INFORMATION MANAGEMENT | Facility: CLINIC | Age: 25
End: 2020-07-24

## 2020-09-12 NOTE — MR AVS SNAPSHOT
After Visit Summary   2/27/2018    Gabriela Hall    MRN: [id number]           Patient Information     Date Of Birth          1995        Visit Information        Provider Department      2/27/2018 3:29 PM Elva Davalos APRN CNP [facility]        Today's Diagnoses     Attention deficit disorder, unspecified hyperactivity presence    -  1       Follow-ups after your visit        Your next 10 appointments already scheduled     May 10, 2018  5:00 PM CDT   (Arrive by 4:45 PM)   Return Visit with Thiago Chong MD   [facility] ([facility])    [address] [place] [address]  3rd [facility] [address] [phone]              Who to contact     If you have questions or need follow up information about today's clinic visit or your schedule please contact [facility] directly at [phone].  Normal or non-critical lab and imaging results will be communicated to you by MyChart, letter or phone within 4 business days after the clinic has received the results. If you do not hear from us within 7 days, please contact the clinic through MyChart or phone. If you have a critical or abnormal lab result, we will notify you by phone as soon as possible.  Submit refill requests through [employer] or call your pharmacy and they will forward the refill request to us. Please allow 3 business days for your refill to be completed.          Additional Information About Your Visit        MyChart Information     [employer] gives you secure access to your electronic health record. If you see a primary care provider, you can also send messages to your care team and make appointments. If you have questions, please call your primary care clinic.  If you do not have a primary care provider, please call [phone] and they will assist you.        Care EveryWhere ID     This is your Care EveryWhere ID. This could be used by other  [facility]    Hospitalist Progress Note    Assessment & Plan   Julia Andre is a 68 year old female who was admitted on 9/2/2020.      Past medical history is significant for stress-induced MI with associated ischemic cardiomyopathy, hypertension, hyperlipidemia, type 2 diabetes, major depressive disorder with anxiety, osteopenia, GERD, obstructive sleep apnea, history of cervical cancer and recent diagnosis of ovarian mass/cancer with elevated CA-125 lab who underwent an elective exploratory laparotomy with total abdominal hysterectomy with bilateral salpingo-oophorectomy, omentectomy with tumor debulking and pelvic and paraaortic lymphadenectomy.  Hospitalist Service has been consulted to assist with medical management.     Ovarian mass/cancer with elevated CA-125 lab study, status post elective exploratory laparotomy with total abdominal hysterectomy with bilateral salpingo-oophorectomy, omentectomy with tumor debulking and pelvic and paraaortic lymphadenectomy:    --chest port placement 9/4.   -Defer analgesic management, DVT prophylaxis and PT/OT assessment to GYN.   Low grade fever 9/6/2020: BC from port positive GNR, started on Zosyn,, BC x2 repeated 9/8/2020 including from port.  BC 9/6/20 = 2/2 Bacteroides, 9/8/2020 1/2 = Bacteroides.  -ID consult, appreciate input.  They felt that port can remain in place for now, follow-up BC  -UC negative  --Monitor temp profile, WBC.    -Abdominal/pelvic CT with 8 x 5 cm fluid collection, LIZZETTE drain placed yesterday, 140 cc output over the last 24 hours.  Initial grams: No growth, NGTD, remains afebrile on Zosyn.   -Per patient, plan is repeat abdominal/pelvic CT on Monday    Bacteremia, 9/6/2020, GNR/Bacteroides, see #1.  -See #1.      Hyperkalemia:  Resolved  Noted potassium of 5.5 peak this admission. Improved to nl range with IVFs and reduction in Lisinopril dose.   -Blood pressure readings are at goal  -Recheck BMP       Mild hyponatremia:   Resolved with NS fluids.   2/2 dehydration     History of stress-induced MI with associated ischemic cardiomyopathy, hypertension and hyperlipidemia:    This appears to be stable at this point.  Upon review of electronic medical records, it appears coronary angiogram at time of MI had clean coronaries.  The patient has been holding baby aspirin daily for the last week.  - good control on current regimen. High in am before am meds    --Hold ASA and defer to OB/GYN as to when this can be resumed.    --Resumed prior to admit lisinopril 10 mg daily, with hold parameters in place  --Resumed prior to admit Crestor 20 mg daily.   --PRN IV hydralazine available for SBP > 180.        Type 2 diabetes:    The patient indicated that this is well controlled and she is currently on metformin 500 mg 2 times daily.    - range. Not needing ISS  --Hold PTA metformin.  restart several days after discharge once po intake at/nearer baseline  -Glucoses have been running in the low 100 range off metformin without requiring SSI, for now continue hold metformin.  --Hypoglycemic protocol in place.      Suspected CKD stage 2-3:  Review of EMR with noted creatinine between 1.0-1.3 and GFR in the 50-60 range and at times in the 40's.  Previous renal US 7/30/2020 with noted bilateral renal cysts.    -KRISSY and hypovolemic hyponatremia resolved with NS fluids      History of iron deficiency anemia with acute blood loss anemia:    Per electronic medical record, the patient underwent a full GI workup and 2012 that indicated tiny erosions in the stomach.  The patient is no longer on iron supplements.   Hb 10 levqh910> 9.5---> 8.5 --> 7.6 ---> 7.9 Asymptomatic  - every other day iron per surgery  --Monitor per surgery     GERD:    The patient infrequently uses antacids.   --PRN Tums are made available.      Obstructive sleep apnea:    The patient indicated that she has a diagnosis of moderate obstructive sleep apnea and has been working on trying  organizations to access your [facility] medical records  [id number]         Blood Pressure from Last 3 Encounters:   12/12/17 126/73   10/04/17 122/76   05/30/17 128/84    Weight from Last 3 Encounters:   12/12/17 157 lb (71.2 kg)   10/04/17 156 lb (70.8 kg)   05/30/17 157 lb (71.2 kg)              Today, you had the following     No orders found for display         Where to get your medicines      Some of these will need a paper prescription and others can be bought over the counter.  Ask your nurse if you have questions.     Bring a paper prescription for each of these medications     lisdexamfetamine 30 MG capsule    lisdexamfetamine 30 MG capsule    lisdexamfetamine 30 MG capsule          Primary Care Provider Office Phone # Fax #    Elva MONROE JOI Davalos [phone] [address] [place] [address]        Equal Access to Services     BENITO MCDERMOTT : Hadii aad ku hadasho Soomaali, waaxda luqadaha, qaybta kaalmada adeegyada, waxay idiin hayquynhn brenda trujillo . So [facility] [phone].    ATENCIÓN: Si habla español, tiene a portillo disposición servicios gratuitos de asistencia lingüística. Llame al [phone].    We comply with applicable federal civil rights laws and [place] laws. We do not discriminate on the basis of race, color, national origin, age, disability, sex, sexual orientation, or gender identity.            Thank you!     Thank you for choosing [facility]  for your care. Our goal is always to provide you with excellent care. Hearing back from our patients is one way we can continue to improve our services. Please take a few minutes to complete the written survey that you may receive in the mail after your visit with us. Thank you!             Your Updated Medication List - Protect others around you: Learn how to safely use, store and throw away your medicines at www.disposemymeds.org.          This list is accurate as of 2/27/18 11:59  "to get a new CPAP machine; however, she would prefer not having this, as she did not tolerate it in the past.    --Monitor O2 spot checks.   -IS     Major depressive disorder with anxiety:    --Resume prior to admit Wellbutrin 300 mg every morning.      History of cervical cancer:    The patient is status post surgical intervention.  Follow-up GYN.        DVT Prophylaxis: Lovenox.  Code Status: Full Code        Disposition Plan-  2-3 days pending culture results from LIZZETTE drain placed yesterday and ID recommendations.    Candie Lebron MD  Hospitalist  [facility]  Text Page (7am - 6pm, M-F)    Interval History   \"It hurts when I go to stand up, but just for a second.\"  Patient complains of pain in the low abdomen/pelvis when she gets out of a chair or out of the bed.  Pain resolves quickly.  She also finds a LIZZETTE drain uncomfortable.  She has no new respiratory or GI complaints.      Physical Exam   Temp: 95.9  F (35.5  C) Temp src: Oral BP: (!) 149/63 Pulse: 58   Resp: 16 SpO2: 96 % O2 Device: None (Room air)    Vitals:    09/09/20 0122 09/11/20 0657 09/12/20 0634   Weight: 70.3 kg (154 lb 14.4 oz) 69.8 kg (153 lb 14.4 oz) 72.1 kg (158 lb 14.4 oz)     Vital Signs with Ranges  Temp:  [95.8  F (35.4  C)-96.4  F (35.8  C)] 95.9  F (35.5  C)  Pulse:  [54-59] 58  Resp:  [16] 16  BP: (149-150)/(57-66) 149/63  SpO2:  [96 %-97 %] 96 %  I/O last 3 completed shifts:  In: -   Out: 195 [Drains:195]    Constitutional: Alert, calm, cooperative, NAD.  Respiratory: CTAB, respirations nonlabored.  Chest: port right upper chest  Cardiovascular: Regular rhythm, no murmur appreciated.  GI: .  Nontender, nondistended.  No rebound, guarding or other peritoneal signs.  LIZZETTE drain in place posterior, minimal serosanguineous output  Skin/Integumen: no rash on gross exam., no calf pain  Neuro, nl speech and mentation  Psych: Affect is pleasant    Medications       amLODIPine  5 mg Oral Daily     aspirin  81 mg Oral Daily     " PM.  Always use your most recent med list.                   Brand Name Dispense Instructions for use Diagnosis    adapalene-benzoyl peroxide 0.1-2.5 % gel    EPIDUO    45 g    Apply to face daily at night.    Acne vulgaris       clindamycin 1 % lotion    CLEOCIN T    60 mL    Apply topically 2 times daily    Acne vulgaris       clobetasol 0.05 % cream    TEMOVATE    15 g    Apply topically 2 times daily Use on individual acne lesions for no more then 4 days    Acne vulgaris       desonide 0.05 % cream    DESOWEN    60 g    Apply to dry inflamed skin around lips at night    Cheilitis       ketoconazole 2 % cream    NIZORAL    60 g    Apply to dry inflamed skin around lips every morning    Cheilitis       * lisdexamfetamine 30 MG capsule    VYVANSE    30 capsule    Take 1 capsule (30 mg) by mouth daily    Attention deficit disorder, unspecified hyperactivity presence       * lisdexamfetamine 30 MG capsule   Start taking on:  4/1/2018    VYVANSE    30 capsule    Take 1 capsule (30 mg) by mouth daily    Attention deficit disorder, unspecified hyperactivity presence       * lisdexamfetamine 30 MG capsule   Start taking on:  5/2/2018    VYVANSE    30 capsule    Take 1 capsule (30 mg) by mouth daily    Attention deficit disorder, unspecified hyperactivity presence       tretinoin 0.025 % cream    RETIN-A    45 g    Apply topically At Bedtime    Acne vulgaris       * Notice:  This list has 3 medication(s) that are the same as other medications prescribed for you. Read the directions carefully, and ask your doctor or other care provider to review them with you.       buPROPion  300 mg Oral QAM     enoxaparin ANTICOAGULANT  40 mg Subcutaneous Q24H     heparin  5 mL Intracatheter Q28 Days     heparin lock flush  5-10 mL Intracatheter Q24H     insulin aspart  1-7 Units Subcutaneous TID AC     insulin aspart  1-5 Units Subcutaneous At Bedtime     lactobacillus rhamnosus (GG)  1 capsule Oral BID     lidocaine  30 mL Subcutaneous Once     lisinopril  10 mg Oral Daily     metoprolol tartrate  50 mg Oral BID     piperacillin-tazobactam  3.375 g Intravenous Q6H     polyethylene glycol  17 g Oral Daily     rosuvastatin  20 mg Oral Daily     senna-docusate  1 tablet Oral At Bedtime     simethicone  80 mg Oral 4x Daily     sodium chloride (PF)  10 mL Irrigation Q8H       Data   Recent Labs   Lab 09/12/20  0610 09/11/20  0600 09/10/20  0530  09/08/20  0540  09/06/20  0600   WBC 7.5 6.6 6.9   < >  --    < > 6.7   HGB 8.2* 8.1* 7.8*   < >  --    < > 7.6*   MCV 86 85 85   < >  --   --  86   * 460* 426   < > 338  --  279   NA  --   --   --   --   --   --  135   POTASSIUM  --   --   --   --   --   --  3.8   CHLORIDE  --   --   --   --   --   --  105   CO2  --   --   --   --   --   --  26   BUN  --   --   --   --   --   --  10   CR  --  0.85  --   --  0.81  --  0.84   ANIONGAP  --   --   --   --   --   --  4   RADHA  --   --   --   --   --   --  7.6*   GLC  --   --   --   --   --   --  117*    < > = values in this interval not displayed.

## 2020-12-01 ENCOUNTER — TRANSFERRED RECORDS (OUTPATIENT)
Dept: HEALTH INFORMATION MANAGEMENT | Facility: CLINIC | Age: 25
End: 2020-12-01

## 2020-12-13 ENCOUNTER — HEALTH MAINTENANCE LETTER (OUTPATIENT)
Age: 25
End: 2020-12-13

## 2021-03-30 ENCOUNTER — TELEPHONE (OUTPATIENT)
Dept: FAMILY MEDICINE | Facility: CLINIC | Age: 26
End: 2021-03-30

## 2021-03-30 NOTE — TELEPHONE ENCOUNTER
Are you able to fit patient in? I can clarify when she is requesting to come in if needed. Earlene Webber -

## 2021-03-30 NOTE — TELEPHONE ENCOUNTER
Pt scheduled with Libia Jacobo on 4/7/21, pt aware Dr. Jacobo delivers at [facility].    Pt will have OB records faxed to [phone].    Marguerite HEWITT, Patient Care

## 2021-03-30 NOTE — TELEPHONE ENCOUNTER
Does pt want to see me for care and delivery or is she wanting a one time visit.    If wants to have me deliver, needs to confirm she can deliver at [facility].    Need records from previous OB care.  Next available appt is currently 4/7/2021 at 1035.    If only a one time appt, can see Griselda Terrazas or Dr. Kline.

## 2021-03-30 NOTE — TELEPHONE ENCOUNTER
Reason for call:  Same Day Appointment   Requested Provider: Libia Jacobo    PCP: [unfilled]    Reason for visit: 26 weeks pregnant     Duration of symptoms: pregnancy     Have you been treated for this in the past? N/A    Additional comments: patient is wanting to schedule an OB appointment with Dr. Libia Jacobo       Phone number to reach patient:  Cell number on file:    Telephone Information:   Mobile [phone]       Best Time:  Anytime     Can we leave a detailed message on this number?  YES    Travel screening: Not Applicable

## 2021-03-31 NOTE — PROGRESS NOTES
"Abstracted labs from faxed records:   Blood type: A RH(D) POSITIVE   Hemoglobin: 12.9  Antibody screen: No antibodies detected  RPR: Non-reactive  Hepatitis B: Non-reactive  Rubella: 1.84 - Rubella immune   HIV: Non-reactive  Chlamydia & GC: Not detected  Pap: satisfactory for evaluation.  Negative for intraepithelial lesions of malignancy  HPV: not detected    US impression from 2/23/21:  1. Fetus is in cephalic lie with anterior placenta without evidence for previa  2. Normal amniotic fluid index.  3. Cervix measures 4.27 cm in length.  4. Estimated fetal weight: 475.47 grams; this is within the 85th percentile for LMP.  5. Estimated date of delivery by ultrasound 06/28/2021      27w3d   /60   Pulse 101   Temp 98.8  F (37.1  C) (Oral)   Resp 16   Ht 1.702 m (5' 7\")   Wt 81.9 kg (180 lb 9.6 oz)   LMP 09/27/2020   BMI 28.29 kg/m     Doing well.  No concerns today.  1 hour GTT done today.  Prenatal flowsheet information is reviewed.  Discussed kick counts and fetal movement.  Reportable signs and symptoms discussed.  Next visit 2 weeks. Tdap  Handout about covid vaccination      "

## 2021-04-07 ENCOUNTER — PRENATAL OFFICE VISIT (OUTPATIENT)
Dept: FAMILY MEDICINE | Facility: CLINIC | Age: 26
End: 2021-04-07
Payer: COMMERCIAL

## 2021-04-07 VITALS
HEART RATE: 101 BPM | HEIGHT: 67 IN | BODY MASS INDEX: 28.35 KG/M2 | DIASTOLIC BLOOD PRESSURE: 60 MMHG | SYSTOLIC BLOOD PRESSURE: 130 MMHG | WEIGHT: 180.6 LBS | RESPIRATION RATE: 16 BRPM | TEMPERATURE: 98.8 F

## 2021-04-07 DIAGNOSIS — Z34.02 SUPERVISION OF NORMAL FIRST PREGNANCY IN SECOND TRIMESTER: Primary | ICD-10-CM

## 2021-04-07 DIAGNOSIS — Z11.9 SCREENING EXAMINATION FOR INFECTIOUS DISEASE: ICD-10-CM

## 2021-04-07 LAB
GLUCOSE 1H P 50 G GLC PO SERPL-MCNC: 118 MG/DL (ref 60–129)
HGB BLD-MCNC: 12.2 G/DL (ref 11.7–15.7)

## 2021-04-07 PROCEDURE — 86803 HEPATITIS C AB TEST: CPT | Performed by: FAMILY MEDICINE

## 2021-04-07 PROCEDURE — 82950 GLUCOSE TEST: CPT | Performed by: FAMILY MEDICINE

## 2021-04-07 PROCEDURE — 99207 PR PRENATAL VISIT: CPT | Performed by: FAMILY MEDICINE

## 2021-04-07 PROCEDURE — 99N1025 PR STATISTIC OBHBG - HEMOGLOBIN: Performed by: FAMILY MEDICINE

## 2021-04-07 PROCEDURE — 36415 COLL VENOUS BLD VENIPUNCTURE: CPT | Performed by: FAMILY MEDICINE

## 2021-04-07 ASSESSMENT — PATIENT HEALTH QUESTIONNAIRE - PHQ9
SUM OF ALL RESPONSES TO PHQ QUESTIONS 1-9: 3
5. POOR APPETITE OR OVEREATING: SEVERAL DAYS

## 2021-04-07 ASSESSMENT — MIFFLIN-ST. JEOR: SCORE: 1586.83

## 2021-04-07 ASSESSMENT — ANXIETY QUESTIONNAIRES
GAD7 TOTAL SCORE: 3
3. WORRYING TOO MUCH ABOUT DIFFERENT THINGS: NOT AT ALL
5. BEING SO RESTLESS THAT IT IS HARD TO SIT STILL: NOT AT ALL
6. BECOMING EASILY ANNOYED OR IRRITABLE: SEVERAL DAYS
IF YOU CHECKED OFF ANY PROBLEMS ON THIS QUESTIONNAIRE, HOW DIFFICULT HAVE THESE PROBLEMS MADE IT FOR YOU TO DO YOUR WORK, TAKE CARE OF THINGS AT HOME, OR GET ALONG WITH OTHER PEOPLE: NOT DIFFICULT AT ALL
7. FEELING AFRAID AS IF SOMETHING AWFUL MIGHT HAPPEN: NOT AT ALL
1. FEELING NERVOUS, ANXIOUS, OR ON EDGE: SEVERAL DAYS
2. NOT BEING ABLE TO STOP OR CONTROL WORRYING: NOT AT ALL

## 2021-04-07 NOTE — NURSING NOTE
"Chief Complaint   Patient presents with     Prenatal Care     YESICA?      Health Maintenance     phq-9       Initial BP (!) 142/79   Pulse 101   Temp 98.8  F (37.1  C) (Oral)   Resp 16   Ht 1.702 m (5' 7\")   Wt 81.9 kg (180 lb 9.6 oz)   LMP 09/27/2020   BMI 28.29 kg/m   Estimated body mass index is 28.29 kg/m  as calculated from the following:    Height as of this encounter: 1.702 m (5' 7\").    Weight as of this encounter: 81.9 kg (180 lb 9.6 oz).  Medication Reconciliation: complete  Blayne Jack CMA    "

## 2021-04-07 NOTE — PATIENT INSTRUCTIONS
Report to or call [facility] [phone] for concerns about pregnancy or Labor.      Next visits are every 2 weeks until 36 weeks then weekly until due date

## 2021-04-08 LAB — HCV AB SERPL QL IA: NONREACTIVE

## 2021-04-08 ASSESSMENT — ANXIETY QUESTIONNAIRES: GAD7 TOTAL SCORE: 3

## 2021-04-15 ENCOUNTER — MYC MEDICAL ADVICE (OUTPATIENT)
Dept: FAMILY MEDICINE | Facility: CLINIC | Age: 26
End: 2021-04-15

## 2021-04-15 DIAGNOSIS — Z34.03 SUPERVISION OF NORMAL FIRST PREGNANCY IN THIRD TRIMESTER: Primary | ICD-10-CM

## 2021-04-16 PROBLEM — Z34.03 SUPERVISION OF NORMAL FIRST PREGNANCY IN THIRD TRIMESTER: Status: ACTIVE | Noted: 2021-04-16

## 2021-04-16 NOTE — PROGRESS NOTES
29w2d   /78   Pulse 82   Temp 98.5  F (36.9  C) (Oral)   Resp 18   Wt 83.5 kg (184 lb)   LMP 09/27/2020   BMI 28.82 kg/m    Doing well.  No concerns today.  Prenatal flowsheet information is reviewed.  Discussed kick counts and fetal movement.  Reportable signs and symptoms discussed.  tdap today, reviewed household contacts should get tdap  Reviewed 28 wk labs.  Next visit 2 weeks

## 2021-04-16 NOTE — PATIENT INSTRUCTIONS
Report to or call [facility] [phone] for concerns about pregnancy or Labor.    Next visit 2 weeks

## 2021-04-17 ENCOUNTER — HEALTH MAINTENANCE LETTER (OUTPATIENT)
Age: 26
End: 2021-04-17

## 2021-04-19 ENCOUNTER — TELEPHONE (OUTPATIENT)
Dept: FAMILY MEDICINE | Facility: CLINIC | Age: 26
End: 2021-04-19

## 2021-04-19 NOTE — TELEPHONE ENCOUNTER
Patient Quality Outreach      Summary:    Patient has the following on her problem list/HM: None    Patient is due/failing the following:   Cervical Cancer Screening - PAP Needed    Type of outreach:    None.  Records received from previous provider.  Sent to abstracting.       Questions for provider review:    None                                                                                                                                     Blayne Jack CMA       Chart routed to closed.

## 2021-04-20 ENCOUNTER — OFFICE VISIT (OUTPATIENT)
Dept: FAMILY MEDICINE | Facility: CLINIC | Age: 26
End: 2021-04-20
Payer: COMMERCIAL

## 2021-04-20 VITALS
BODY MASS INDEX: 28.82 KG/M2 | HEART RATE: 82 BPM | WEIGHT: 184 LBS | SYSTOLIC BLOOD PRESSURE: 139 MMHG | DIASTOLIC BLOOD PRESSURE: 78 MMHG | TEMPERATURE: 98.5 F | RESPIRATION RATE: 18 BRPM

## 2021-04-20 DIAGNOSIS — Z34.03 SUPERVISION OF NORMAL FIRST PREGNANCY IN THIRD TRIMESTER: Primary | ICD-10-CM

## 2021-04-20 DIAGNOSIS — Z23 NEED FOR TDAP VACCINATION: ICD-10-CM

## 2021-04-20 PROCEDURE — 90471 IMMUNIZATION ADMIN: CPT | Performed by: FAMILY MEDICINE

## 2021-04-20 PROCEDURE — 99207 PR PRENATAL VISIT: CPT | Performed by: FAMILY MEDICINE

## 2021-04-20 PROCEDURE — 90715 TDAP VACCINE 7 YRS/> IM: CPT | Performed by: FAMILY MEDICINE

## 2021-04-20 NOTE — NURSING NOTE
Prior to injection verified patient identity using patient's name and date of birth.    Patient instructed to wait in clinic for 20 minutes following injection and to notify staff of any adverse reactions immediately.     Screening Questionnaire for Adult Immunization     Are you sick today?   No   Do you have allergies to medications, food or any vaccine?   Yes   Have you ever had a serious reaction after receiving a vaccination?   No   Do you have a long-term health problem with heart disease, lung disease,  asthma, kidney disease, diabetes, anemia, metabolic or blood disease?   No   Do you have cancer, leukemia, AIDS, or any immune system problem?   No   Do you take cortisone, prednisone, other steroids, or anticancer drugs, or  have you had any x-ray (radiation) treatments?   No   Have you had a seizure, brain, or other nervous system problem?   No   During the past year, have you received a transfusion of blood or blood       products, or been given a medicine called immune (gamma) globulin?   No   For women: Are you pregnant or is there a chance you could become         pregnant during the next month?   Yes   Have you received any vaccinations in the past 4 weeks?   No    Immunization questionnaire was positive for at least one answer.  Notified Dr. Jacobo .      MNV doesn't apply on this patient  Blayne Jack, [facility]

## 2021-04-20 NOTE — NURSING NOTE
"Chief Complaint   Patient presents with     Prenatal Care       Initial /78   Pulse 82   Temp 98.5  F (36.9  C) (Oral)   Resp 18   Wt 83.5 kg (184 lb)   LMP 09/27/2020   BMI 28.82 kg/m   Estimated body mass index is 28.82 kg/m  as calculated from the following:    Height as of 4/7/21: 1.702 m (5' 7\").    Weight as of this encounter: 83.5 kg (184 lb).  Medication Reconciliation: complete  Blayne Jack, NASIR    "

## 2021-04-22 ENCOUNTER — MYC MEDICAL ADVICE (OUTPATIENT)
Dept: FAMILY MEDICINE | Facility: CLINIC | Age: 26
End: 2021-04-22

## 2021-05-05 ENCOUNTER — PRENATAL OFFICE VISIT (OUTPATIENT)
Dept: FAMILY MEDICINE | Facility: CLINIC | Age: 26
End: 2021-05-05
Payer: COMMERCIAL

## 2021-05-05 VITALS
SYSTOLIC BLOOD PRESSURE: 124 MMHG | DIASTOLIC BLOOD PRESSURE: 78 MMHG | RESPIRATION RATE: 14 BRPM | HEART RATE: 82 BPM | TEMPERATURE: 98.3 F | WEIGHT: 184.8 LBS | BODY MASS INDEX: 28.94 KG/M2

## 2021-05-05 DIAGNOSIS — Z34.03 SUPERVISION OF NORMAL FIRST PREGNANCY IN THIRD TRIMESTER: Primary | ICD-10-CM

## 2021-05-05 PROCEDURE — 99207 PR PRENATAL VISIT: CPT | Performed by: FAMILY MEDICINE

## 2021-05-05 NOTE — PROGRESS NOTES
31w3d   /78   Pulse 82   Temp 98.3  F (36.8  C) (Oral)   Resp 14   Wt 83.8 kg (184 lb 12.8 oz)   LMP 09/27/2020   BMI 28.94 kg/m       Doing well.  No concerns today.  Prenatal flowsheet information is reviewed.  Discussed kick counts and fetal movement.  Reportable signs and symptoms discussed.  Next visit 2 weeks

## 2021-05-05 NOTE — NURSING NOTE
"Chief Complaint   Patient presents with     Prenatal Care       Initial /78   Pulse 82   Temp 98.3  F (36.8  C) (Oral)   Resp 14   Wt 83.8 kg (184 lb 12.8 oz)   LMP 09/27/2020   BMI 28.94 kg/m   Estimated body mass index is 28.94 kg/m  as calculated from the following:    Height as of 4/7/21: 1.702 m (5' 7\").    Weight as of this encounter: 83.8 kg (184 lb 12.8 oz).  Medication Reconciliation: complete  Blayne Jack CMA    "

## 2021-05-05 NOTE — PATIENT INSTRUCTIONS
Report to or call [facility] [phone] for concerns about pregnancy or Labor.      Next visit 2 weeks

## 2021-05-22 NOTE — PROGRESS NOTES
34w1d   /79   Pulse 108   Temp 98.7  F (37.1  C) (Oral)   Resp 16   Wt 85.9 kg (189 lb 6.4 oz)   LMP 09/27/2020   BMI 29.66 kg/m       Doing well.  No concerns today.  Cervix check and GBS next visit.  Prenatal flowsheet information is reviewed.  Discussed kick counts and fetal movement.  Reportable signs and symptoms discussed.  Next visit 2 weeks

## 2021-05-22 NOTE — PATIENT INSTRUCTIONS
Report to or call [facility] [phone] for concerns about pregnancy or Labor.    Next visit 2 weeks   No

## 2021-05-24 ENCOUNTER — PRENATAL OFFICE VISIT (OUTPATIENT)
Dept: FAMILY MEDICINE | Facility: CLINIC | Age: 26
End: 2021-05-24
Payer: COMMERCIAL

## 2021-05-24 VITALS
TEMPERATURE: 98.7 F | WEIGHT: 189.4 LBS | HEART RATE: 108 BPM | SYSTOLIC BLOOD PRESSURE: 138 MMHG | RESPIRATION RATE: 16 BRPM | DIASTOLIC BLOOD PRESSURE: 79 MMHG | BODY MASS INDEX: 29.66 KG/M2

## 2021-05-24 DIAGNOSIS — Z34.03 SUPERVISION OF NORMAL FIRST PREGNANCY IN THIRD TRIMESTER: Primary | ICD-10-CM

## 2021-05-24 PROCEDURE — 99207 PR PRENATAL VISIT: CPT | Performed by: FAMILY MEDICINE

## 2021-05-24 NOTE — NURSING NOTE
"Chief Complaint   Patient presents with     Prenatal Care       Initial /79   Pulse 108   Temp 98.7  F (37.1  C) (Oral)   Resp 16   Wt 85.9 kg (189 lb 6.4 oz)   LMP 09/27/2020   BMI 29.66 kg/m   Estimated body mass index is 29.66 kg/m  as calculated from the following:    Height as of 4/7/21: 1.702 m (5' 7\").    Weight as of this encounter: 85.9 kg (189 lb 6.4 oz).  Medication Reconciliation: complete  Blayne Jack CMA    "

## 2021-06-02 ENCOUNTER — PRENATAL OFFICE VISIT (OUTPATIENT)
Dept: FAMILY MEDICINE | Facility: CLINIC | Age: 26
End: 2021-06-02
Payer: COMMERCIAL

## 2021-06-02 VITALS
WEIGHT: 191.8 LBS | TEMPERATURE: 98.3 F | BODY MASS INDEX: 30.04 KG/M2 | HEART RATE: 90 BPM | DIASTOLIC BLOOD PRESSURE: 82 MMHG | RESPIRATION RATE: 20 BRPM | SYSTOLIC BLOOD PRESSURE: 135 MMHG

## 2021-06-02 DIAGNOSIS — Z34.03 SUPERVISION OF NORMAL FIRST PREGNANCY IN THIRD TRIMESTER: Primary | ICD-10-CM

## 2021-06-02 PROCEDURE — 99207 PR PRENATAL VISIT: CPT | Performed by: FAMILY MEDICINE

## 2021-06-02 PROCEDURE — 87653 STREP B DNA AMP PROBE: CPT | Performed by: FAMILY MEDICINE

## 2021-06-02 NOTE — PROGRESS NOTES
35w3d  /82   Pulse 90   Temp 98.3  F (36.8  C) (Oral)   Resp 20   Wt 87 kg (191 lb 12.8 oz)   LMP 09/27/2020   BMI 30.04 kg/m    Doing well.  No concerns today.  Cervix is closed/40%/-2/mod/mid.  Cephalic position confirmed by Leopold maneuvers and cervical exam.  GBS done today.  Prenatal flowsheet information is reviewed.  Discussed signs of labor and when to call or come in.  Discussed kick counts and fetal movement.  Reportable signs and symptoms discussed.  Follow-up weekly

## 2021-06-02 NOTE — PATIENT INSTRUCTIONS
[facility] [phone] for concerns about pregnancy or Labor.      Next visit 1 week

## 2021-06-02 NOTE — NURSING NOTE
"Chief Complaint   Patient presents with     Prenatal Care     constipation, hemorrhoids x 1 week.  Today dark red blood noted with bowel movement.  This AM brown vaginal discharge.  Possible erika cook with pelvic pressure.   Side ache with walking        Initial /82   Pulse 90   Temp 98.3  F (36.8  C) (Oral)   Resp 20   Wt 87 kg (191 lb 12.8 oz)   LMP 09/27/2020   BMI 30.04 kg/m   Estimated body mass index is 30.04 kg/m  as calculated from the following:    Height as of 4/7/21: 1.702 m (5' 7\").    Weight as of this encounter: 87 kg (191 lb 12.8 oz).  Medication Reconciliation: complete  Blayne Jack CMA    "

## 2021-06-03 LAB
GP B STREP DNA SPEC QL NAA+PROBE: POSITIVE
SPECIMEN SOURCE: ABNORMAL

## 2021-06-09 ENCOUNTER — PRENATAL OFFICE VISIT (OUTPATIENT)
Dept: FAMILY MEDICINE | Facility: CLINIC | Age: 26
End: 2021-06-09
Payer: COMMERCIAL

## 2021-06-09 VITALS
WEIGHT: 191.6 LBS | BODY MASS INDEX: 30.01 KG/M2 | DIASTOLIC BLOOD PRESSURE: 74 MMHG | TEMPERATURE: 98.2 F | SYSTOLIC BLOOD PRESSURE: 124 MMHG | RESPIRATION RATE: 16 BRPM | HEART RATE: 79 BPM

## 2021-06-09 DIAGNOSIS — Z34.03 SUPERVISION OF NORMAL FIRST PREGNANCY IN THIRD TRIMESTER: Primary | ICD-10-CM

## 2021-06-09 PROCEDURE — 99207 PR PRENATAL VISIT: CPT | Performed by: FAMILY MEDICINE

## 2021-06-09 ASSESSMENT — PAIN SCALES - GENERAL: PAINLEVEL: NO PAIN (0)

## 2021-06-09 NOTE — PROGRESS NOTES
36w3d   /74   Pulse 79   Temp 98.2  F (36.8  C) (Oral)   Resp 16   Wt 86.9 kg (191 lb 9.6 oz)   LMP 09/27/2020   BMI 30.01 kg/m       Doing well.  No concerns today.  Cephalic position confirmed by Leopold maneuvers and ultrasound.  GBS pos.  Prenatal flowsheet information is reviewed.  Discussed signs of labor and when to call or come in.  Discussed kick counts and fetal movement.  Reportable signs and symptoms discussed.  Follow-up weekly

## 2021-06-09 NOTE — PATIENT INSTRUCTIONS
[facility] [phone] for concerns about pregnancy or Labor.    Weekly visits

## 2021-06-09 NOTE — NURSING NOTE
"Chief Complaint   Patient presents with     Prenatal Care     Sunday- nausea and vomiting x 2 hours- uncertain if heat or heartburn, woke with contractions x 1, baby moving well- concerns about baby positon.        Initial BP (!) 138/90   Pulse 79   Temp 98.2  F (36.8  C) (Oral)   Resp 16   Wt 86.9 kg (191 lb 9.6 oz)   LMP 09/27/2020   BMI 30.01 kg/m   Estimated body mass index is 30.01 kg/m  as calculated from the following:    Height as of 4/7/21: 1.702 m (5' 7\").    Weight as of this encounter: 86.9 kg (191 lb 9.6 oz).  Medication Reconciliation: complete  Blayne Jack CMA    "

## 2021-06-23 ENCOUNTER — PRENATAL OFFICE VISIT (OUTPATIENT)
Dept: FAMILY MEDICINE | Facility: CLINIC | Age: 26
End: 2021-06-23
Payer: COMMERCIAL

## 2021-06-23 VITALS
DIASTOLIC BLOOD PRESSURE: 80 MMHG | WEIGHT: 193 LBS | TEMPERATURE: 97.9 F | HEART RATE: 76 BPM | BODY MASS INDEX: 30.23 KG/M2 | RESPIRATION RATE: 18 BRPM | SYSTOLIC BLOOD PRESSURE: 124 MMHG

## 2021-06-23 DIAGNOSIS — Z34.03 SUPERVISION OF NORMAL FIRST PREGNANCY IN THIRD TRIMESTER: Primary | ICD-10-CM

## 2021-06-23 PROCEDURE — 99207 PR PRENATAL VISIT: CPT | Performed by: FAMILY MEDICINE

## 2021-06-23 NOTE — NURSING NOTE
"Chief Complaint   Patient presents with     Prenatal Care     has had random cramping- nothing consistent.   baby moving well        Initial /80   Pulse 76   Temp 97.9  F (36.6  C) (Oral)   Resp 18   Wt 87.5 kg (193 lb)   LMP 09/27/2020   BMI 30.23 kg/m   Estimated body mass index is 30.23 kg/m  as calculated from the following:    Height as of 4/7/21: 1.702 m (5' 7\").    Weight as of this encounter: 87.5 kg (193 lb).  Medication Reconciliation: complete  Blayne Jack CMA    "

## 2021-06-23 NOTE — PROGRESS NOTES
38w3d  /80   Pulse 76   Temp 97.9  F (36.6  C) (Oral)   Resp 18   Wt 87.5 kg (193 lb)   LMP 09/27/2020   BMI 30.23 kg/m      Doing well.  No concerns today.  Cervix is posterior, finger-tip dilated/ 40%/ 0 and soft  Cephalic position confirmed by Leopold maneuvers and cervical exam.  GBS neg.  Prenatal flowsheet information is reviewed.  Discussed signs of labor and when to call or come in.  Discussed kick counts and fetal movement.  Reportable signs and symptoms discussed.  Weekly visits

## 2021-06-23 NOTE — PATIENT INSTRUCTIONS
[facility] [phone] for concerns about pregnancy or Labor.    Weekly visits

## 2021-06-30 ENCOUNTER — PRENATAL OFFICE VISIT (OUTPATIENT)
Dept: FAMILY MEDICINE | Facility: CLINIC | Age: 26
End: 2021-06-30
Payer: COMMERCIAL

## 2021-06-30 VITALS
DIASTOLIC BLOOD PRESSURE: 81 MMHG | SYSTOLIC BLOOD PRESSURE: 134 MMHG | BODY MASS INDEX: 30.29 KG/M2 | HEART RATE: 73 BPM | RESPIRATION RATE: 16 BRPM | TEMPERATURE: 97.9 F | WEIGHT: 193.4 LBS

## 2021-06-30 DIAGNOSIS — Z34.03 SUPERVISION OF NORMAL FIRST PREGNANCY IN THIRD TRIMESTER: Primary | ICD-10-CM

## 2021-06-30 DIAGNOSIS — B95.1 POSITIVE GBS TEST: ICD-10-CM

## 2021-06-30 PROCEDURE — 59426 ANTEPARTUM CARE ONLY: CPT | Performed by: FAMILY MEDICINE

## 2021-06-30 PROCEDURE — 99207 PR PRENATAL VISIT: CPT | Performed by: FAMILY MEDICINE

## 2021-06-30 NOTE — PROGRESS NOTES
39w3d  /81   Pulse 73   Temp 97.9  F (36.6  C) (Oral)   Resp 16   Wt 87.7 kg (193 lb 6.4 oz)   LMP 09/27/2020   BMI 30.29 kg/m    Doing well.  No concerns today.  Cervix check next visit.  Cephalic position confirmed by Leopold maneuvers.  Prenatal flowsheet information is reviewed.  Discussed indications, risks, complications and failure rate of inductions.  Discussed signs of labor and when to call or come in.  Discussed kick counts and fetal movement.  Reportable signs and symptoms discussed.  Weekly visits.  Anticipate 7/12/2021 for induction if not in labor.

## 2021-06-30 NOTE — NURSING NOTE
"Chief Complaint   Patient presents with     Prenatal Care     Monday had 2 hours consistent contractions every 10 minutres- lower back, higher abdomen.  Pelvic pressure, baby moving well        Initial /81   Pulse 73   Temp 97.9  F (36.6  C) (Oral)   Resp 16   Wt 87.7 kg (193 lb 6.4 oz)   LMP 09/27/2020   BMI 30.29 kg/m   Estimated body mass index is 30.29 kg/m  as calculated from the following:    Height as of 4/7/21: 1.702 m (5' 7\").    Weight as of this encounter: 87.7 kg (193 lb 6.4 oz).  Medication Reconciliation: complete  Blayne Jack CMA    "

## 2021-06-30 NOTE — PATIENT INSTRUCTIONS
Report to [facility] [phone] for concerns about pregnancy or Labor.      Next visit 1 week

## 2021-07-01 ENCOUNTER — MYC MEDICAL ADVICE (OUTPATIENT)
Dept: FAMILY MEDICINE | Facility: CLINIC | Age: 26
End: 2021-07-01

## 2021-07-02 ENCOUNTER — MYC MEDICAL ADVICE (OUTPATIENT)
Dept: FAMILY MEDICINE | Facility: CLINIC | Age: 26
End: 2021-07-02

## 2021-07-02 NOTE — TELEPHONE ENCOUNTER
39w5d  Left message on answering machine for patient/parent to call back.   [phone]    Dilcia Osborne RN

## 2021-07-02 NOTE — TELEPHONE ENCOUNTER
Patient reports good fetal movement.  Contractions are very inconsistent.   Contractions have improved.  Patient ? If was she was dehydrated, drank lots of water, gatorade and went for a walk.  BH ctx were not as strong as last night or lasting as long.  Some ctx lasted 30 - 60 seconds.  Last night did have painful urination, however patient reports that she related this to the Ctx.  Patient reports her urine today, is clear and without pain, frequency or hesitancy.     Discussed, per Dr Libia Jacobo recommends when the contractions are 5-7 minutes apart and last 1-2 hours.  If the contractions persist even while you are resting.   When in doubt you can always go to Labor and Delivery for an assessment, if you are not in labor they will send you home.   Of course you can call the clinic as well for counseling at [phone].    Patient/parent verbalized understanding of instructions provided and agreed with the plan of care    To Dr Libia Jacobo, please advise.  Dilcia Osborne RN

## 2021-07-05 ENCOUNTER — TELEPHONE (OUTPATIENT)
Dept: FAMILY MEDICINE | Facility: CLINIC | Age: 26
End: 2021-07-05

## 2021-07-05 NOTE — TELEPHONE ENCOUNTER
Per Dr. Jacobo please contact patient to create new chart for baby.  Currently have appointment held for 7/8/21 at 12:00 to be converted to baby once chart is ready.       Dr. Jacobo prefers the 12:00 slot vs. Other team slots as this gives time if any labs need to be drawn.     Blayne Jack, [facility]

## 2021-07-05 NOTE — TELEPHONE ENCOUNTER
Baby was born 7/3/21 at [facility].  Please contact the mother to set up baby's chart.   Appointment Wednesday is being held in mother's name until baby's chart is created.    Blayne Jack, [facility]

## 2021-07-05 NOTE — TELEPHONE ENCOUNTER
Can not make a chart without baby being born/ to put in chart. Will have to wait until baby is born. Earlene BECERRA -

## 2021-07-06 NOTE — TELEPHONE ENCOUNTER
Babies records are in my pending records folder on my desk. Records state baby was born on 7/4, MA reported baby was born on 7/3. Waiting to hear from mom to confirm and schedule. Earlene BECERRA -

## 2021-08-09 ENCOUNTER — MEDICAL CORRESPONDENCE (OUTPATIENT)
Dept: HEALTH INFORMATION MANAGEMENT | Facility: CLINIC | Age: 26
End: 2021-08-09

## 2021-08-25 NOTE — PROGRESS NOTES
SUBJECTIVE: Gabriela is here for a 6-week postpartum checkup.    Delivery date was 2021. She had a c/s for FTP of a viable girl, weight 7 pounds 14 oz., with no complications.  Since delivery, she has been breast feeding.  She has no signs of infection, bleeding or other complications. Scar well healed, occas itch. She is not pregnant.  We discussed contraceptions and she has chosen natural family planning.  She  has not had intercourse since delivery and complains of No discomfort. Patient screened for postpartum depression and complaints are NEGATIVE. Screening has also been completed for intimate partner violence.    EXAM:  Today's Depression Rating was   PHQ-9 SCORE 2021   PHQ-9 Total Score -   PHQ-9 Total Score 3   EPDS: 4    GENERAL healthy, alert and no distress  EYES EOMI, intact visual fields, PERRL and funduscopic deferred  ABD: well healed  scar  GYN PELVIC: normal external genitalia, normal urethral meatus, normal vaginal mucosa, normal cervix, normal adnexa, no masses or tenderness and uterus normal size and shape  MS: Extremities normal. No deformaties, edema or skin discoloration.  SKIN: no ulcers, lesions or rash  PSYCH: normal cognition, bright affect    ASSESSMENT:   Normal postpartum exam after c/s for FTP.    PLAN:  Return as needed or at time of next expected pap, pelvic, or breast exam.

## 2021-09-07 ENCOUNTER — MEDICAL CORRESPONDENCE (OUTPATIENT)
Dept: HEALTH INFORMATION MANAGEMENT | Facility: CLINIC | Age: 26
End: 2021-09-07

## 2021-09-07 ENCOUNTER — PRENATAL OFFICE VISIT (OUTPATIENT)
Dept: FAMILY MEDICINE | Facility: CLINIC | Age: 26
End: 2021-09-07
Payer: COMMERCIAL

## 2021-09-07 VITALS
HEART RATE: 63 BPM | WEIGHT: 167.8 LBS | RESPIRATION RATE: 14 BRPM | SYSTOLIC BLOOD PRESSURE: 111 MMHG | TEMPERATURE: 97.9 F | BODY MASS INDEX: 26.28 KG/M2 | DIASTOLIC BLOOD PRESSURE: 71 MMHG

## 2021-09-07 PROCEDURE — 99207 PR POST PARTUM EXAM: CPT | Performed by: FAMILY MEDICINE

## 2021-09-07 ASSESSMENT — PATIENT HEALTH QUESTIONNAIRE - PHQ9
10. IF YOU CHECKED OFF ANY PROBLEMS, HOW DIFFICULT HAVE THESE PROBLEMS MADE IT FOR YOU TO DO YOUR WORK, TAKE CARE OF THINGS AT HOME, OR GET ALONG WITH OTHER PEOPLE: NOT DIFFICULT AT ALL
SUM OF ALL RESPONSES TO PHQ QUESTIONS 1-9: 1
SUM OF ALL RESPONSES TO PHQ QUESTIONS 1-9: 1

## 2021-09-07 NOTE — NURSING NOTE
"Chief Complaint   Patient presents with     Post Partum Exam       Initial /71   Pulse 63   Temp 97.9  F (36.6  C) (Oral)   Resp 14   Wt 76.1 kg (167 lb 12.8 oz)   LMP 09/27/2020   Breastfeeding Yes   BMI 26.28 kg/m   Estimated body mass index is 26.28 kg/m  as calculated from the following:    Height as of 4/7/21: 1.702 m (5' 7\").    Weight as of this encounter: 76.1 kg (167 lb 12.8 oz).  Medication Reconciliation: complete  Blayne Jack CMA    "

## 2021-09-08 ASSESSMENT — PATIENT HEALTH QUESTIONNAIRE - PHQ9: SUM OF ALL RESPONSES TO PHQ QUESTIONS 1-9: 1

## 2021-09-26 ENCOUNTER — HEALTH MAINTENANCE LETTER (OUTPATIENT)
Age: 26
End: 2021-09-26

## 2021-10-15 ENCOUNTER — OFFICE VISIT (OUTPATIENT)
Dept: URGENT CARE | Facility: URGENT CARE | Age: 26
End: 2021-10-15
Payer: COMMERCIAL

## 2021-10-15 VITALS
DIASTOLIC BLOOD PRESSURE: 76 MMHG | WEIGHT: 170.8 LBS | OXYGEN SATURATION: 100 % | RESPIRATION RATE: 10 BRPM | BODY MASS INDEX: 26.75 KG/M2 | TEMPERATURE: 98.6 F | HEART RATE: 60 BPM | SYSTOLIC BLOOD PRESSURE: 124 MMHG

## 2021-10-15 DIAGNOSIS — R07.0 THROAT PAIN: ICD-10-CM

## 2021-10-15 DIAGNOSIS — J02.9 ACUTE PHARYNGITIS, UNSPECIFIED ETIOLOGY: Primary | ICD-10-CM

## 2021-10-15 LAB — DEPRECATED S PYO AG THROAT QL EIA: NEGATIVE

## 2021-10-15 PROCEDURE — 99213 OFFICE O/P EST LOW 20 MIN: CPT | Performed by: PHYSICIAN ASSISTANT

## 2021-10-15 PROCEDURE — 87651 STREP A DNA AMP PROBE: CPT | Performed by: PHYSICIAN ASSISTANT

## 2021-10-15 NOTE — PROGRESS NOTES
Assessment & Plan        1. Acute pharyngitis, unspecified etiology      2. Throat pain    - Streptococcus A Rapid Screen w/Reflex to PCR - Clinic Collect  - Group A Streptococcus PCR Throat Swab    RST negative. Culture pending. Throat hygiene. Follow up if not improving over the next 3 days. Acetaminophen as needed.                 MITCHELL Ding [facility]     Preethi is a 25 year old female who presents to clinic today for the following health issues:  Chief Complaint   Patient presents with     Pharyngitis     Red spots on back of throat      Sinus Problem     Pressure around eyes      HPI    Sore throat for 2 days with headache and fatigue. No fever or cough. No ear pain. Body aches. No strep exposure. Some strep history. No medication today.         Review of Systems        Objective    /76   Pulse 60   Temp 98.6  F (37  C) (Tympanic)   Resp 10   Wt 77.5 kg (170 lb 12.8 oz)   SpO2 100%   BMI 26.75 kg/m    Physical Exam  Vitals and nursing note reviewed.   Constitutional:       General: She is not in acute distress.     Appearance: She is well-developed. She is not diaphoretic.   HENT:      Head: Normocephalic and atraumatic.      Right Ear: Tympanic membrane and external ear normal.      Left Ear: Tympanic membrane and external ear normal.      Mouth/Throat:      Pharynx: No posterior oropharyngeal erythema.   Eyes:      Pupils: Pupils are equal, round, and reactive to light.   Pulmonary:      Effort: Pulmonary effort is normal. No respiratory distress.      Breath sounds: Normal breath sounds.   Musculoskeletal:      Cervical back: Normal range of motion and neck supple.   Lymphadenopathy:      Cervical: No cervical adenopathy.   Skin:     General: Skin is warm and dry.   Neurological:      Mental Status: She is alert.      Cranial Nerves: No cranial nerve deficit.

## 2021-10-17 LAB — GROUP A STREP BY PCR: NOT DETECTED

## 2021-11-04 ENCOUNTER — MEDICAL CORRESPONDENCE (OUTPATIENT)
Dept: HEALTH INFORMATION MANAGEMENT | Facility: CLINIC | Age: 26
End: 2021-11-04
Payer: COMMERCIAL

## 2021-12-08 ENCOUNTER — E-VISIT (OUTPATIENT)
Dept: FAMILY MEDICINE | Facility: CLINIC | Age: 26
End: 2021-12-08
Payer: COMMERCIAL

## 2021-12-08 DIAGNOSIS — Z20.822 CLOSE EXPOSURE TO 2019 NOVEL CORONAVIRUS: Primary | ICD-10-CM

## 2021-12-08 PROCEDURE — 99421 OL DIG E/M SVC 5-10 MIN: CPT | Performed by: FAMILY MEDICINE

## 2021-12-09 ENCOUNTER — LAB (OUTPATIENT)
Dept: LAB | Facility: CLINIC | Age: 26
End: 2021-12-09
Attending: FAMILY MEDICINE
Payer: COMMERCIAL

## 2021-12-09 DIAGNOSIS — Z20.822 CLOSE EXPOSURE TO 2019 NOVEL CORONAVIRUS: ICD-10-CM

## 2021-12-09 PROCEDURE — U0005 INFEC AGEN DETEC AMPLI PROBE: HCPCS

## 2021-12-09 PROCEDURE — U0003 INFECTIOUS AGENT DETECTION BY NUCLEIC ACID (DNA OR RNA); SEVERE ACUTE RESPIRATORY SYNDROME CORONAVIRUS 2 (SARS-COV-2) (CORONAVIRUS DISEASE [COVID-19]), AMPLIFIED PROBE TECHNIQUE, MAKING USE OF HIGH THROUGHPUT TECHNOLOGIES AS DESCRIBED BY CMS-2020-01-R: HCPCS

## 2021-12-09 NOTE — PATIENT INSTRUCTIONS
"  Dear Gabriela Hall,    Based on your exposure to COVID-19 (coronavirus), we would like to test you for this virus. I have placed an order for this test.The best time for testing is 5-7 days after the exposure.    How to schedule:  Go to your [employer] home page and scroll down to the section that says  You have an appointment that needs to be scheduled  and click the large green button that says  Schedule Now  and follow the steps to find the next available opening.     If you are unable to complete these [employer] scheduling steps, please call [phone] to schedule your testing.     Return to work/school/ guidance:   For people with high risk exposures outside the home    Please let your workplace manager and staffing office know when your quarantine ends.     We can not give you an exact date as it depends on the information below. You can calculate this on your own or work with your manager/staffing office to calculate this. (For example if you were exposed on 10/4, you would have to quarantine for 14 full days. That would be through 10/18. You could return on 10/19.)    Quarantine Guidelines:  Patients (\"contacts\") who have been in close prolonged contact of an infected person(s) (within six feet for at least 15 minutes within a 24 hour period), and remain asymptomatic should enter quarantine based on the following options:    14-day quarantine period (this remains the CDC recommendation for the greatest protection against spread of COVID-19) OR    Minimum 7-day quarantine with negative RT-PCR test collected on day 5 or later OR    10-day quarantine with no test  Quarantine Guideline exceptions are as follows:    People who have been fully vaccinated do not need to quarantine if the exposure was at least 2 weeks after the last vaccination. This includes vaccinated health care workers.    Not fully vaccinated and unvaccinated Individuals who work in health care, congregate care, or congregate living " should be off work for 14 days from their last date of exposure. Community activities for this group can be resumed based on options above. Fully vaccinated individuals in this group do not need to quarantine from work after exposure.    Not fully vaccinated and unvaccinated people whose high-risk exposure was a household member should always quarantine for 14 days from their last date of exposure. Fully vaccinated people in this category do not need to quarantine.    Not fully vaccinated or unvaccinated residents of congregate care and congregate living settings should always quarantine for 14 days from their last date of exposure. Fully vaccinated residents do not need to quarantine.  Note: If you have ongoing exposure to the covid positive person, this quarantine period may be more than 14 days. (For example, if you are continued to be exposed to your child who tested positive and cannot isolate from them, then the quarantine of 7-14 days can't start until your child is no longer contagious. This is typically 10 days from onset of the child's symptoms. So the total duration may be 17-24 days in this case.)    You should continue symptom monitoring until day 14 post-exposure. If you develop signs or symptoms of COVID-19, isolate and get tested (even if you have been tested already).    How to quarantine:   Stay home and away from others. Don't go to school or anywhere else. Generally quarantine means staying home from work but there are some exceptions to this. Please contact your workplace.  No hugging, kissing or shaking hands.  Don't let anyone visit.  Cover your mouth and nose with a mask, tissue or washcloth to avoid spreading germs.  Wash your hands and face often. Use soap and water.    What are the symptoms of COVID-19?  The most common symptoms are cough, fever and trouble breathing. Less common symptoms include headache, body aches, fatigue (feeling very tired), chills, sore throat, stuffy or runny nose,  diarrhea (loose poop), loss of taste or smell, belly pain, and nausea or vomiting (feeling sick to your stomach or throwing up).  After 14 days, if you have still don't have symptoms, you likely don't have this virus.  If you develop symptoms, follow these guidelines.  If you're normally healthy: Please start another eVisit.  If you have a serious health problem (like cancer, heart failure, an organ transplant or kidney disease): Call your specialty clinic. Let them know that you might have COVID-19.    Where can I get more information?  [facility] - About COVID-19: [email or web address]  CDC - What to Do If You're Sick: www.cdc.gov/coronavirus/2019-ncov/about/steps-when-sick.html  CDC - Ending Home Isolation: www.cdc.gov/coronavirus/2019-ncov/hcp/disposition-in-home-patients.html  CDC - Caring for Someone: www.cdc.gov/coronavirus/2019-ncov/if-you-are-sick/care-for-someone.html  [facility] clinical trials (COVID-19 research studies): clinicalaffairs.[facility]-clinical-trials  Below are the COVID-19 hotlines at the [place] Department of Health ([facility]). Interpreters are available.  For health questions: Call 236-489-2579 or 1-977.418.1050 (7 a.m. to 7 p.m.)  For questions about schools and childcare: Call 264-060-9622 or 1-546.184.2917 (7 a.m. to 7 p.m.)

## 2021-12-10 LAB — SARS-COV-2 RNA RESP QL NAA+PROBE: NEGATIVE

## 2021-12-16 ENCOUNTER — E-VISIT (OUTPATIENT)
Dept: FAMILY MEDICINE | Facility: CLINIC | Age: 26
End: 2021-12-16
Payer: COMMERCIAL

## 2021-12-16 DIAGNOSIS — Z20.822 CLOSE EXPOSURE TO 2019 NOVEL CORONAVIRUS: Primary | ICD-10-CM

## 2021-12-16 PROCEDURE — 99421 OL DIG E/M SVC 5-10 MIN: CPT | Performed by: FAMILY MEDICINE

## 2021-12-17 NOTE — PATIENT INSTRUCTIONS
"  Dear Gabriela Hall,    Based on your exposure to COVID-19 (coronavirus), we would like to test you for this virus. I have placed an order for this test.The best time for testing is 5-7 days after the exposure.    How to schedule:  Go to your [employer] home page and scroll down to the section that says  You have an appointment that needs to be scheduled  and click the large green button that says  Schedule Now  and follow the steps to find the next available opening.     If you are unable to complete these [employer] scheduling steps, please call [phone] to schedule your testing.     Return to work/school/ guidance:   For people with high risk exposures outside the home    Please let your workplace manager and staffing office know when your quarantine ends.     We can not give you an exact date as it depends on the information below. You can calculate this on your own or work with your manager/staffing office to calculate this. (For example if you were exposed on 10/4, you would have to quarantine for 14 full days. That would be through 10/18. You could return on 10/19.)    Quarantine Guidelines:  Patients (\"contacts\") who have been in close prolonged contact of an infected person(s) (within six feet for at least 15 minutes within a 24 hour period), and remain asymptomatic should enter quarantine based on the following options:    14-day quarantine period (this remains the CDC recommendation for the greatest protection against spread of COVID-19) OR    Minimum 7-day quarantine with negative RT-PCR test collected on day 5 or later OR    10-day quarantine with no test  Quarantine Guideline exceptions are as follows:    People who have been fully vaccinated do not need to quarantine if the exposure was at least 2 weeks after the last vaccination. This includes vaccinated health care workers.    Not fully vaccinated and unvaccinated Individuals who work in health care, congregate care, or congregate living " should be off work for 14 days from their last date of exposure. Community activities for this group can be resumed based on options above. Fully vaccinated individuals in this group do not need to quarantine from work after exposure.    Not fully vaccinated and unvaccinated people whose high-risk exposure was a household member should always quarantine for 14 days from their last date of exposure. Fully vaccinated people in this category do not need to quarantine.    Not fully vaccinated or unvaccinated residents of congregate care and congregate living settings should always quarantine for 14 days from their last date of exposure. Fully vaccinated residents do not need to quarantine.  Note: If you have ongoing exposure to the covid positive person, this quarantine period may be more than 14 days. (For example, if you are continued to be exposed to your child who tested positive and cannot isolate from them, then the quarantine of 7-14 days can't start until your child is no longer contagious. This is typically 10 days from onset of the child's symptoms. So the total duration may be 17-24 days in this case.)    You should continue symptom monitoring until day 14 post-exposure. If you develop signs or symptoms of COVID-19, isolate and get tested (even if you have been tested already).    How to quarantine:   Stay home and away from others. Don't go to school or anywhere else. Generally quarantine means staying home from work but there are some exceptions to this. Please contact your workplace.  No hugging, kissing or shaking hands.  Don't let anyone visit.  Cover your mouth and nose with a mask, tissue or washcloth to avoid spreading germs.  Wash your hands and face often. Use soap and water.    What are the symptoms of COVID-19?  The most common symptoms are cough, fever and trouble breathing. Less common symptoms include headache, body aches, fatigue (feeling very tired), chills, sore throat, stuffy or runny nose,  diarrhea (loose poop), loss of taste or smell, belly pain, and nausea or vomiting (feeling sick to your stomach or throwing up).  After 14 days, if you have still don't have symptoms, you likely don't have this virus.  If you develop symptoms, follow these guidelines.  If you're normally healthy: Please start another eVisit.  If you have a serious health problem (like cancer, heart failure, an organ transplant or kidney disease): Call your specialty clinic. Let them know that you might have COVID-19.    Where can I get more information?  [facility] - About COVID-19: [email or web address]  CDC - What to Do If You're Sick: www.cdc.gov/coronavirus/2019-ncov/about/steps-when-sick.html  CDC - Ending Home Isolation: www.cdc.gov/coronavirus/2019-ncov/hcp/disposition-in-home-patients.html  CDC - Caring for Someone: www.cdc.gov/coronavirus/2019-ncov/if-you-are-sick/care-for-someone.html  [facility] clinical trials (COVID-19 research studies): clinicalaffairs.[facility]-clinical-trials  Below are the COVID-19 hotlines at the [place] Department of Health ([facility]). Interpreters are available.  For health questions: Call 772-709-8560 or 1-932.267.2194 (7 a.m. to 7 p.m.)  For questions about schools and childcare: Call 910-014-6201 or 1-321.365.8177 (7 a.m. to 7 p.m.)

## 2021-12-18 ENCOUNTER — LAB (OUTPATIENT)
Dept: LAB | Facility: CLINIC | Age: 26
End: 2021-12-18
Attending: FAMILY MEDICINE
Payer: COMMERCIAL

## 2021-12-18 DIAGNOSIS — Z20.822 CLOSE EXPOSURE TO 2019 NOVEL CORONAVIRUS: ICD-10-CM

## 2021-12-18 PROCEDURE — U0005 INFEC AGEN DETEC AMPLI PROBE: HCPCS

## 2021-12-18 PROCEDURE — U0003 INFECTIOUS AGENT DETECTION BY NUCLEIC ACID (DNA OR RNA); SEVERE ACUTE RESPIRATORY SYNDROME CORONAVIRUS 2 (SARS-COV-2) (CORONAVIRUS DISEASE [COVID-19]), AMPLIFIED PROBE TECHNIQUE, MAKING USE OF HIGH THROUGHPUT TECHNOLOGIES AS DESCRIBED BY CMS-2020-01-R: HCPCS

## 2021-12-19 LAB — SARS-COV-2 RNA RESP QL NAA+PROBE: NEGATIVE

## 2021-12-22 ENCOUNTER — E-VISIT (OUTPATIENT)
Dept: FAMILY MEDICINE | Facility: CLINIC | Age: 26
End: 2021-12-22
Payer: COMMERCIAL

## 2021-12-22 DIAGNOSIS — N61.0 MASTITIS: Primary | ICD-10-CM

## 2021-12-22 PROCEDURE — 99207 PR NO CHARGE LOS: CPT | Performed by: FAMILY MEDICINE

## 2021-12-23 RX ORDER — CEPHALEXIN 500 MG/1
500 CAPSULE ORAL 3 TIMES DAILY
Qty: 30 CAPSULE | Refills: 0 | Status: SHIPPED | OUTPATIENT
Start: 2021-12-23 | End: 2022-01-02

## 2022-01-16 ENCOUNTER — MYC MEDICAL ADVICE (OUTPATIENT)
Dept: FAMILY MEDICINE | Facility: CLINIC | Age: 27
End: 2022-01-16
Payer: COMMERCIAL

## 2022-01-16 DIAGNOSIS — M25.562 LEFT KNEE PAIN, UNSPECIFIED CHRONICITY: Primary | ICD-10-CM

## 2022-01-27 ENCOUNTER — MEDICAL CORRESPONDENCE (OUTPATIENT)
Dept: HEALTH INFORMATION MANAGEMENT | Facility: CLINIC | Age: 27
End: 2022-01-27
Payer: COMMERCIAL

## 2022-02-17 ENCOUNTER — E-VISIT (OUTPATIENT)
Dept: FAMILY MEDICINE | Facility: CLINIC | Age: 27
End: 2022-02-17
Payer: COMMERCIAL

## 2022-02-17 DIAGNOSIS — R19.7 BLOODY DIARRHEA: Primary | ICD-10-CM

## 2022-02-17 PROCEDURE — 99207 PR NON-BILLABLE SERV PER CHARTING: CPT | Performed by: FAMILY MEDICINE

## 2022-03-15 ENCOUNTER — TELEPHONE (OUTPATIENT)
Dept: FAMILY MEDICINE | Facility: CLINIC | Age: 27
End: 2022-03-15
Payer: COMMERCIAL

## 2022-03-15 NOTE — TELEPHONE ENCOUNTER
Incoming call from [place] [employer] staff stating pt is interested in donating her surplus breast milk. Caller states a form was faxed to clinic, but has not been completed and returned, so she is inquiring if form was received.     RN advised there is no indication in pt chart that forms have been received. [employer] staff states there may have been discrepancy regarding pt's last name and will refax the form.    BRYON SolN, RN

## 2022-03-22 ENCOUNTER — TELEPHONE (OUTPATIENT)
Dept: FAMILY MEDICINE | Facility: CLINIC | Age: 27
End: 2022-03-22
Payer: COMMERCIAL

## 2022-03-22 PROBLEM — Z34.03 SUPERVISION OF NORMAL FIRST PREGNANCY IN THIRD TRIMESTER: Status: RESOLVED | Noted: 2021-04-16 | Resolved: 2022-03-22

## 2022-03-22 NOTE — TELEPHONE ENCOUNTER
Reason for Call:  Form, our goal is to have forms completed with 72 hours, however, some forms may require a visit or additional information.    Type of letter, form or note:  medical    Who is the form from?: [place] [employer] (if other please explain)    Where did the form come from: form was faxed in    What clinic location was the form placed at?: [place]    Where the form was placed: Given to physician    What number is listed as a contact on the form?: [place] [employer] [facility] [employer] - (f) [phone]       Additional comments: placed in provider's box to complete and route back to TC.    Call taken on 3/22/2022 at 12:51 PM by Jessica Burrows

## 2022-03-30 ENCOUNTER — MYC MEDICAL ADVICE (OUTPATIENT)
Dept: FAMILY MEDICINE | Facility: CLINIC | Age: 27
End: 2022-03-30
Payer: COMMERCIAL

## 2022-04-04 ENCOUNTER — LAB (OUTPATIENT)
Dept: LAB | Facility: CLINIC | Age: 27
End: 2022-04-04
Payer: COMMERCIAL

## 2022-05-21 ENCOUNTER — MYC MEDICAL ADVICE (OUTPATIENT)
Dept: FAMILY MEDICINE | Facility: CLINIC | Age: 27
End: 2022-05-21
Payer: COMMERCIAL

## 2022-05-21 ENCOUNTER — E-VISIT (OUTPATIENT)
Dept: FAMILY MEDICINE | Facility: CLINIC | Age: 27
End: 2022-05-21
Payer: COMMERCIAL
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
## 2022-05-21 PROCEDURE — 99421 OL DIG E/M SVC 5-10 MIN: CPT | Performed by: FAMILY MEDICINE

## 2022-06-19 ENCOUNTER — MYC MEDICAL ADVICE (OUTPATIENT)
Dept: FAMILY MEDICINE | Facility: CLINIC | Age: 27
End: 2022-06-19

## 2022-06-20 ENCOUNTER — MYC MEDICAL ADVICE (OUTPATIENT)
Dept: FAMILY MEDICINE | Facility: CLINIC | Age: 27
End: 2022-06-20

## 2022-06-20 ENCOUNTER — OFFICE VISIT (OUTPATIENT)
Dept: FAMILY MEDICINE | Facility: CLINIC | Age: 27
End: 2022-06-20
Payer: COMMERCIAL

## 2022-06-20 VITALS
HEART RATE: 69 BPM | WEIGHT: 152.2 LBS | TEMPERATURE: 98.1 F | SYSTOLIC BLOOD PRESSURE: 126 MMHG | DIASTOLIC BLOOD PRESSURE: 87 MMHG | OXYGEN SATURATION: 99 % | RESPIRATION RATE: 16 BRPM | BODY MASS INDEX: 23.84 KG/M2

## 2022-06-20 DIAGNOSIS — R10.2 PELVIC PAIN IN FEMALE: ICD-10-CM

## 2022-06-20 DIAGNOSIS — N76.0 VAGINITIS AND VULVOVAGINITIS: ICD-10-CM

## 2022-06-20 DIAGNOSIS — R82.90 ABNORMAL URINE ODOR: Primary | ICD-10-CM

## 2022-06-20 DIAGNOSIS — N91.2 AMENORRHEA: ICD-10-CM

## 2022-06-20 LAB
ALBUMIN UR-MCNC: NEGATIVE MG/DL
APPEARANCE UR: CLEAR
BILIRUB UR QL STRIP: NEGATIVE
CLUE CELLS: ABNORMAL
COLOR UR AUTO: YELLOW
GLUCOSE UR STRIP-MCNC: NEGATIVE MG/DL
HCG UR QL: NEGATIVE
HGB UR QL STRIP: NEGATIVE
KETONES UR STRIP-MCNC: NEGATIVE MG/DL
LEUKOCYTE ESTERASE UR QL STRIP: ABNORMAL
NITRATE UR QL: NEGATIVE
PH UR STRIP: 5.5 [PH] (ref 5–7)
RBC #/AREA URNS AUTO: ABNORMAL /HPF
SP GR UR STRIP: <=1.005 (ref 1–1.03)
SQUAMOUS #/AREA URNS AUTO: ABNORMAL /LPF
TRICHOMONAS, WET PREP: ABNORMAL
UROBILINOGEN UR STRIP-ACNC: 0.2 E.U./DL
WBC #/AREA URNS AUTO: ABNORMAL /HPF
WBC'S/HIGH POWER FIELD, WET PREP: ABNORMAL
YEAST, WET PREP: ABNORMAL

## 2022-06-20 PROCEDURE — 87210 SMEAR WET MOUNT SALINE/INK: CPT | Performed by: FAMILY MEDICINE

## 2022-06-20 PROCEDURE — 81001 URINALYSIS AUTO W/SCOPE: CPT | Performed by: FAMILY MEDICINE

## 2022-06-20 PROCEDURE — 99214 OFFICE O/P EST MOD 30 MIN: CPT | Performed by: FAMILY MEDICINE

## 2022-06-20 PROCEDURE — 81025 URINE PREGNANCY TEST: CPT | Performed by: FAMILY MEDICINE

## 2022-06-20 RX ORDER — ESTRADIOL 0.1 MG/G
2 CREAM VAGINAL
Qty: 42.5 G | Refills: 3 | Status: SHIPPED | OUTPATIENT
Start: 2022-06-20 | End: 2022-09-13

## 2022-06-20 ASSESSMENT — PAIN SCALES - GENERAL: PAINLEVEL: MODERATE PAIN (4)

## 2022-06-20 ASSESSMENT — ENCOUNTER SYMPTOMS: BACK PAIN: 1

## 2022-06-20 NOTE — TELEPHONE ENCOUNTER
Called mobile phone and left voicemail to call back [phone] option 2 to set up an appointment with Dr Jacobo.

## 2022-06-20 NOTE — PROGRESS NOTES
Assessment & Plan     (R82.90) Abnormal urine odor  (primary encounter diagnosis)  Comment: normal UA  Plan: UA with Microscopic reflex to Culture - lab         collect, Wet prep - lab collect, Urine         Microscopic        Treatment not indicated    (N91.2) Amenorrhea  (R10.2) Pelvic pain in female  Comment: due to breast feeding  Plan: HCG Qual, Urine ([id number]), US Pelvic Complete         with Transvaginal        hcg neg, suspect may be close to starting menses  US to assess    (N76.0) Vaginitis and vulvovaginitis  Comment: may be due to dryness from breast feeding  Plan: estradiol (ESTRACE) 0.1 MG/GM vaginal cream        Trial of estrace until stops breast feeding                Patient Instructions       Please call [phone] to schedule your ultrasound.       Return in about 2 weeks (around 7/4/2022) for if not improved or symptoms worsen.    Libia Jacobo MD  [facility]   Gabriela is a 26 year old, presenting for the following health issues:  Back Pain      Back Pain     History of Present Illness       Back Pain:  She presents for follow up of back pain. Patient's back pain is a new problem.    Original cause of back pain: not sure  First noticed back pain: 1-4 weeks ago  Patient feels back pain: comes and goesLocation of back pain:  Right lower back, left lower back, right middle of back, left middle of back and right buttock  Description of back pain: burning, dull ache and sharp  Back pain spreads: right buttocks    Since patient first noticed back pain, pain is: unchanged  Does back pain interfere with her job:  Not applicable  On a scale of 1-10 (10 being the worst), patient describes pain as:  5  What makes back pain worse: nothing  Acupuncture: not tried  Acetaminophen: not helpful  Activity or exercise: not helpful  Chiropractor:  Not tried  Cold: not helpful  Heat: not helpful  Massage: not helpful  Muscle relaxants: not tried  NSAIDS: not  helpful  Opioids: not tried  Physical Therapy: not tried  Rest: not helpful  Steroid Injection: not tried  Stretching: not helpful  Surgery: not tried  TENS unit: not tried  Topical pain relievers: not helpful  Other healthcare providers patient is seeing for back pain: None    She eats 2-3 servings of fruits and vegetables daily.She consumes 0 sweetened beverage(s) daily.She exercises with enough effort to increase her heart rate 30 to 60 minutes per day.  She exercises with enough effort to increase her heart rate 6 days per week.   She is taking medications regularly.     Pain mainly with movement.  No radicular symptoms, no f/c/s.  Some mild on/off pelvic pressure.  No menses since prior to pregnancy, currently still breast feeding without major change in pattern.  Note some vaginal dryness and discomfort with sex recently. Also has seen some increased discharge.  Notes abnormal smell to urine for past 2 days.  No dysuria/freq/urgency            Review of Systems   Musculoskeletal: Positive for back pain.      Constitutional, HEENT, cardiovascular, pulmonary, gi and gu systems are negative, except as otherwise noted.      Objective    /87   Pulse 69   Temp 98.1  F (36.7  C) (Tympanic)   Resp 16   Wt 69 kg (152 lb 3.2 oz)   SpO2 99%   BMI 23.84 kg/m    Body mass index is 23.84 kg/m .  Physical Exam   GENERAL: healthy, alert and no distress  EYES: Eyes grossly normal to inspection, PERRL and conjunctivae and sclerae normal  ABDOMEN: soft, nontender, no hepatosplenomegaly, no masses and bowel sounds normal  MS: no gross musculoskeletal defects noted, no edema  SKIN: no suspicious lesions or rashes  Comprehensive back pain exam:  Tenderness of thoracic paraspinous muscles, Range of motion not limited by pain, Lower extremity strength functional and equal on both sides, Lower extremity reflexes within normal limits bilaterally and Lower extremity sensation normal and equal on both sides  PSYCH: mentation  appears normal, affect normal/bright    UA RESULTS:  Recent Labs   Lab Test 06/20/22  1422   COLOR Yellow   APPEARANCE Clear   URINEGLC Negative   URINEBILI Negative   URINEKETONE Negative   SG <=1.005   UBLD Negative   URINEPH 5.5   PROTEIN Negative   UROBILINOGEN 0.2   NITRITE Negative   LEUKEST Trace*   RBCU None Seen   WBCU None Seen      wet prep: no yeast, clue or trich, 3+ wbc                .  ..

## 2022-06-20 NOTE — TELEPHONE ENCOUNTER
Patient returning call to  for appointment  MAGDIEL slots are now taken, provider has not openings  To provider to advise    Allyssa CAMACHO, RN

## 2022-06-22 ENCOUNTER — ANCILLARY PROCEDURE (OUTPATIENT)
Dept: ULTRASOUND IMAGING | Facility: CLINIC | Age: 27
End: 2022-06-22
Attending: FAMILY MEDICINE
Payer: COMMERCIAL

## 2022-06-22 DIAGNOSIS — N91.2 AMENORRHEA: ICD-10-CM

## 2022-06-22 DIAGNOSIS — R10.2 PELVIC PAIN IN FEMALE: ICD-10-CM

## 2022-06-22 PROCEDURE — 76856 US EXAM PELVIC COMPLETE: CPT | Mod: TC | Performed by: RADIOLOGY

## 2022-06-22 PROCEDURE — 76830 TRANSVAGINAL US NON-OB: CPT | Mod: TC | Performed by: RADIOLOGY

## 2022-09-12 ASSESSMENT — ENCOUNTER SYMPTOMS
EYE PAIN: 0
HEARTBURN: 0
FEVER: 0
HEADACHES: 0
ARTHRALGIAS: 0
DIZZINESS: 0
BREAST MASS: 0
DYSURIA: 0
COUGH: 0
CHILLS: 0
PALPITATIONS: 0
FREQUENCY: 1
MYALGIAS: 0
JOINT SWELLING: 0
DIARRHEA: 0
WEAKNESS: 0
NERVOUS/ANXIOUS: 1
HEMATURIA: 0
HEMATOCHEZIA: 0
SORE THROAT: 0
NAUSEA: 0
SHORTNESS OF BREATH: 0
CONSTIPATION: 0
ABDOMINAL PAIN: 0
PARESTHESIAS: 0

## 2022-09-12 NOTE — PROGRESS NOTES
SUBJECTIVE:   CC: Gabriela Hall is an 26 year old woman who presents for preventive health visit.       Patient has been advised of split billing requirements and indicates understanding: Yes  Healthy Habits:     Getting at least 3 servings of Calcium per day:  Yes    Bi-annual eye exam:  NO    Dental care twice a year:  Yes    Sleep apnea or symptoms of sleep apnea:  None    Diet:  Regular (no restrictions)    Frequency of exercise:  6-7 days/week    Duration of exercise:  30-45 minutes    Taking medications regularly:  Yes    Medication side effects:  Not applicable    PHQ-2 Total Score: 0    Additional concerns today:  No              Today's PHQ-2 Score:   PHQ-2 ( 1999 [employer]) 9/12/2022   Q1: Little interest or pleasure in doing things 0   Q2: Feeling down, depressed or hopeless 0   PHQ-2 Score 0   PHQ-2 Total Score (12-17 Years)- Positive if 3 or more points; Administer PHQ-A if positive -   Q1: Little interest or pleasure in doing things Not at all   Q2: Feeling down, depressed or hopeless Not at all   PHQ-2 Score 0       Abuse: Current or Past (Physical, Sexual or Emotional) - No  Do you feel safe in your environment? Yes    Have you ever done Advance Care Planning? (For example, a Health Directive, POLST, or a discussion with a medical provider or your loved ones about your wishes): No, advance care planning information given to patient to review.  Patient plans to discuss their wishes with loved ones or provider.      Social History     Tobacco Use     Smoking status: Never Smoker     Smokeless tobacco: Never Used   Substance Use Topics     Alcohol use: Yes     Comment: socially         Alcohol Use 9/12/2022   Prescreen: >3 drinks/day or >7 drinks/week? No       Reviewed orders with patient.  Reviewed health maintenance and updated orders accordingly - Yes    G 1 P 1   No LMP recorded (lmp unknown).     Fasting: No   Td: tdap 4/2021       Flu: 12/2020      Covid: Pf 8/30/2021      Shingrix:  NA      PPV: NA      Last 3 Pap and HPV Results:   PAP / HPV 6/15/2016   PAP (Historical) NIL                  Cholesterol: No results found for: CHOL  No results found for: HDL  No results found for: LDL  No results found for: TRIG  No results found for: CHOLHDLRATIO      MMG: NA  Dexa:  NA     Flex/colo: NA      Seat Belt: Yes    Sunscreen use: Yes   Calcium Intake: adeq  Health Care Directive: No  Sexually Active: Yes     Current contraception: natural family planning  History of abnormal Pap smear: No  Family history of colon/breast/ovarian cancer: No  Regular self breast exam: No  History of abnormal mammogram: No      Labs reviewed in EPIC  BP Readings from Last 3 Encounters:   09/13/22 103/63   06/20/22 126/87   10/15/21 124/76    Wt Readings from Last 3 Encounters:   09/13/22 69.1 kg (152 lb 6.4 oz)   06/20/22 69 kg (152 lb 3.2 oz)   10/15/21 77.5 kg (170 lb 12.8 oz)                  Patient Active Problem List   Diagnosis     Acne     Controlled substance agreement signed     Attention deficit hyperactivity disorder (ADHD), other type     Situational anxiety     Past Surgical History:   Procedure Laterality Date     ABDOMEN SURGERY  07/04/2021     BIOPSY  2019    Checked for skin cancer     NO HISTORY OF SURGERY         Social History     Tobacco Use     Smoking status: Never Smoker     Smokeless tobacco: Never Used   Substance Use Topics     Alcohol use: Yes     Comment: socially     Family History   Problem Relation Age of Onset     Respiratory Maternal Grandmother      Alzheimer Disease Paternal Grandmother      Heart Disease Paternal Grandfather      Cancer Paternal Grandfather         ?melanoma--precancer     Other Cancer Paternal Grandfather      Psychotic Disorder Brother         ADHD     Hypertension Father      Hyperlipidemia Father      Skin Cancer No family hx of         no skin cancer         Current Outpatient Medications   Medication Sig Dispense Refill     Prenatal Vit-Fe Fumarate-FA  "(PRENATAL PO) Take by mouth daily         Breast Cancer Screening:    Breast CA Risk Assessment (FHS-7) 9/9/2022   Do you have a family history of breast, colon, or ovarian cancer? No / Unknown         Patient under 40 years of age: Routine Mammogram Screening not recommended.   Pertinent mammograms are reviewed under the imaging tab.      Reviewed and updated as needed this visit by clinical staff   Tobacco  Allergies  Meds  Problems  Med Hx  Surg Hx  Fam Hx  Soc   Hx          Reviewed and updated as needed this visit by Provider   Tobacco  Allergies  Meds  Problems  Med Hx  Surg Hx  Fam Hx               Review of Systems   Constitutional: Negative for chills and fever.   HENT: Negative for congestion, ear pain, hearing loss and sore throat.    Eyes: Negative for pain and visual disturbance.   Respiratory: Negative for cough and shortness of breath.    Cardiovascular: Negative for chest pain, palpitations and peripheral edema.   Gastrointestinal: Negative for abdominal pain, constipation, diarrhea, heartburn, hematochezia and nausea.   Breasts:  Positive for tenderness. Negative for breast mass and discharge.   Genitourinary: Positive for frequency and vaginal discharge. Negative for dysuria, genital sores, hematuria, pelvic pain, urgency and vaginal bleeding.   Musculoskeletal: Negative for arthralgias, joint swelling and myalgias.   Skin: Negative for rash.   Neurological: Negative for dizziness, weakness, headaches and paresthesias.   Psychiatric/Behavioral: Positive for mood changes. The patient is nervous/anxious.           OBJECTIVE:   /63   Pulse 67   Temp 97.6  F (36.4  C) (Oral)   Resp 14   Ht 1.727 m (5' 8\")   Wt 69.1 kg (152 lb 6.4 oz)   LMP  (LMP Unknown)   SpO2 99%   Breastfeeding Yes   BMI 23.17 kg/m    Physical Exam  GENERAL: healthy, alert and no distress  EYES: Eyes grossly normal to inspection, PERRL and conjunctivae and sclerae normal  HENT: ear canals and TM's " "normal, nose and mouth without ulcers or lesions  NECK: no adenopathy, no asymmetry, masses, or scars and thyroid normal to palpation  RESP: lungs clear to auscultation - no rales, rhonchi or wheezes  BREAST: deferred due to breast feeding  CV: regular rate and rhythm, normal S1 S2, no S3 or S4, no murmur, click or rub, no peripheral edema and peripheral pulses strong  ABDOMEN: soft, nontender, no hepatosplenomegaly, no masses and bowel sounds normal   (female): normal female external genitalia, normal urethral meatus, vaginal mucosa pink, moist, well rugated, and normal cervix/adnexa/uterus without masses or discharge  MS: no gross musculoskeletal defects noted, no edema  SKIN: no suspicious lesions or rashes  NEURO: Normal strength and tone, mentation intact and speech normal  PSYCH: mentation appears normal, affect normal/bright    Diagnostic Test Results:  Labs reviewed in Epic    ASSESSMENT/PLAN:   (Z00.00) Routine general medical examination at a health care facility  (primary encounter diagnosis)  Comment: preventive needs reviewed   Plan: Pap Screen reflex to HPV if ASCUS - recommend         age 25 - 29        see orders in Epic.     (R10.2) Pelvic pain in female  Comment: since delivery, some pain with sex  Plan: Physical Therapy Referral                  COUNSELING:  Reviewed preventive health counseling, as reflected in patient instructions  Special attention given to:        Regular exercise       Healthy diet/nutrition    Estimated body mass index is 23.17 kg/m  as calculated from the following:    Height as of this encounter: 1.727 m (5' 8\").    Weight as of this encounter: 69.1 kg (152 lb 6.4 oz).        She reports that she has never smoked. She has never used smokeless tobacco.      Counseling Resources:  ATP IV Guidelines  Pooled Cohorts Equation Calculator  Breast Cancer Risk Calculator  BRCA-Related Cancer Risk Assessment: FHS-7 Tool  FRAX Risk Assessment  ICSI Preventive Guidelines  Dietary " Guidelines for Americans, 2010  USDA's MyPlate  ASA Prophylaxis  Lung CA Screening    Libia Jacobo MD  [facility]

## 2022-09-13 ENCOUNTER — OFFICE VISIT (OUTPATIENT)
Dept: FAMILY MEDICINE | Facility: CLINIC | Age: 27
End: 2022-09-13
Payer: COMMERCIAL

## 2022-09-13 VITALS
HEART RATE: 67 BPM | WEIGHT: 152.4 LBS | TEMPERATURE: 97.6 F | DIASTOLIC BLOOD PRESSURE: 63 MMHG | RESPIRATION RATE: 14 BRPM | HEIGHT: 68 IN | OXYGEN SATURATION: 99 % | SYSTOLIC BLOOD PRESSURE: 103 MMHG | BODY MASS INDEX: 23.1 KG/M2

## 2022-09-13 DIAGNOSIS — R10.2 PELVIC PAIN IN FEMALE: ICD-10-CM

## 2022-09-13 DIAGNOSIS — Z00.00 ROUTINE GENERAL MEDICAL EXAMINATION AT A HEALTH CARE FACILITY: Primary | ICD-10-CM

## 2022-09-13 PROCEDURE — G0145 SCR C/V CYTO,THINLAYER,RESCR: HCPCS | Performed by: FAMILY MEDICINE

## 2022-09-13 PROCEDURE — 99395 PREV VISIT EST AGE 18-39: CPT | Performed by: FAMILY MEDICINE

## 2022-09-13 ASSESSMENT — ENCOUNTER SYMPTOMS
FREQUENCY: 1
NERVOUS/ANXIOUS: 1
PALPITATIONS: 0
DIZZINESS: 0
DYSURIA: 0
CONSTIPATION: 0
ARTHRALGIAS: 0
ABDOMINAL PAIN: 0
PARESTHESIAS: 0
JOINT SWELLING: 0
EYE PAIN: 0
BREAST MASS: 0
FEVER: 0
COUGH: 0
CHILLS: 0
MYALGIAS: 0
DIARRHEA: 0
HEARTBURN: 0
SHORTNESS OF BREATH: 0
HEMATOCHEZIA: 0
SORE THROAT: 0
WEAKNESS: 0
HEADACHES: 0
HEMATURIA: 0
NAUSEA: 0

## 2022-09-13 ASSESSMENT — PAIN SCALES - GENERAL: PAINLEVEL: NO PAIN (0)

## 2022-09-13 NOTE — NURSING NOTE
"Chief Complaint   Patient presents with     Physical       Initial /63   Pulse 67   Temp 97.6  F (36.4  C) (Oral)   Resp 14   Ht 1.727 m (5' 8\")   Wt 69.1 kg (152 lb 6.4 oz)   LMP  (LMP Unknown)   SpO2 99%   Breastfeeding Yes   BMI 23.17 kg/m   Estimated body mass index is 23.17 kg/m  as calculated from the following:    Height as of this encounter: 1.727 m (5' 8\").    Weight as of this encounter: 69.1 kg (152 lb 6.4 oz).  Medication Reconciliation: complete  Blayne Jack CMA    "

## 2022-09-16 LAB
BKR LAB AP GYN ADEQUACY: NORMAL
BKR LAB AP GYN INTERPRETATION: NORMAL
BKR LAB AP HPV REFLEX: NORMAL
BKR LAB AP PREVIOUS ABNORMAL: NORMAL
PATH REPORT.COMMENTS IMP SPEC: NORMAL
PATH REPORT.COMMENTS IMP SPEC: NORMAL
PATH REPORT.RELEVANT HX SPEC: NORMAL

## 2022-10-05 ENCOUNTER — MYC MEDICAL ADVICE (OUTPATIENT)
Dept: FAMILY MEDICINE | Facility: CLINIC | Age: 27
End: 2022-10-05

## 2022-10-11 ENCOUNTER — THERAPY VISIT (OUTPATIENT)
Dept: PHYSICAL THERAPY | Facility: CLINIC | Age: 27
End: 2022-10-11
Attending: FAMILY MEDICINE
Payer: COMMERCIAL

## 2022-10-11 DIAGNOSIS — R35.0 URINARY FREQUENCY: ICD-10-CM

## 2022-10-11 DIAGNOSIS — R39.15 URINARY URGENCY: ICD-10-CM

## 2022-10-11 DIAGNOSIS — R10.2 PELVIC PAIN IN FEMALE: ICD-10-CM

## 2022-10-11 PROCEDURE — 97112 NEUROMUSCULAR REEDUCATION: CPT | Mod: GP | Performed by: PHYSICAL THERAPIST

## 2022-10-11 PROCEDURE — 97110 THERAPEUTIC EXERCISES: CPT | Mod: GP | Performed by: PHYSICAL THERAPIST

## 2022-10-11 PROCEDURE — 97530 THERAPEUTIC ACTIVITIES: CPT | Mod: GP | Performed by: PHYSICAL THERAPIST

## 2022-10-11 PROCEDURE — 97161 PT EVAL LOW COMPLEX 20 MIN: CPT | Mod: GP | Performed by: PHYSICAL THERAPIST

## 2022-10-11 NOTE — PROGRESS NOTES
"Physical Therapy Initial Evaluation  Subjective:  The history is provided by the patient. No  was used.   Patient Health History  Gabriela Hall being seen for Strengthening pelvic floor.     Problem began: 2021.   Problem occurred: I had a  on 2021   Pain is reported as 5/10 (intercourse) on pain scale.  General health as reported by patient is good.  Pertinent medical history includes: none.   Red flags:  None as reported by patient.  Medical allergies: none.   Surgeries include:  Other. Other surgery history details: .    Current medications:  None.    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   Primary job tasks include:  Driving, lifting/carrying, prolonged sitting, prolonged standing, pushing/pulling and repetitive tasks.                  Therapist Generated HPI Evaluation  Problem details: Date of MD order for this episode was 22. Bella presents today for pain with intercourse and urinary frequency/urgency.  She had a csection on 21. She is nursing part time currently and just resumed period last month. She was able to have intercourse painfree up until the pregnancy.   Pain-some lower abdominal cramping and dull pressure around csec scar during ovulation and during period. Pain with intercourse every time(even with lubricant) deeper, not with entering. PL 5-6/10, she can have pain that lingers after.   Bladder-Urgency which can be strong \"just hits\", happens 2-3x/day. Void times day are every hour, night voids are 1-2x. Stream seems weaker at night but has the urgency to go. Does have post void dribble. No urinary leakage  Fluids-1 coffee in am 8 oz, 50-60 oz water.   Bowel-has BM 1x/day, no straining. Type 4 on bristol stool scale Does get the urge to go.   Diet-does watches her fiber, healthy diet  No feeling of prolapse  Exercise-5x/wk, 30-60 min, weight training, walking, intervels  Bella gave permission for the internal exam.         Type of " "problem:  Pelvic dysfunction (dyspareunia, urgency, frequency,).    This is a new condition.  Condition occurred with:  During pregnancy.  Where condition occurred: for unknown reasons.    Pain is described as sharp and cramping and is intermittent.  Pain is the same all the time.  Since onset symptoms are unchanged.  Exacerbated by: triggers for urgency-walking 20-30 min, after workouts, car, washing dishes.  and relieved by nothing.      Work activity restrictions: stay at home mom.  Barriers include:  None as reported by patient.                        Objective:  System                                 Pelvic Dysfunction Evaluation:        Flexibility:    Tightness present at:Hamstrings  Tightness not present at:  Adductors; Iliopsoas; Piriformis or Gluteals    Abdominal Wall:  Abdominal wall pelvic: DEDRA 2 fingersbreath at umbilicus and above,  chest breather.  Diastasis Recti:  Normal    Scar Mobility:  Red scar but mobile  Pelvic Clock Exam:    Ischiocavernosis pain:  -  Bulbocavernosis pain:  -  Transverse Perineal:  -  Levator ANI:  -  Perineal Body:  -  SI Provocation:    Positive for: ASLR  Negative for:  SI Compression and Fabres        External Assessment:    Skin Condition:  Normal      Tissue Symmetry:  Normal  Introitus:  Normal  Muscle Contraction/Perineal Mobility:  Slight lift, no urogential triangle descent  Internal Assessment:  Internal assessment pelvic: L LA 5-6/10 pain to palpation, others 0/10 Able to activate B LA OI but endurance for a contraction is very low 3\", also calls in glute max and adductors.  Sensory Exam:  Normal  Contraction/Grade:  Weak squeeze, 2 second hold (2)    Accessory Muscle use-Gluteals:  X  Accessory Muscle use-Adductors:  X    SEMG Biofeedback:  Semg biofeedback pelvic: possibly later in plan, intravaginal MFR/NMR for now.                  Additional History:  Delivery History:    Number of Pregnancies: 1  Number of Live Births: 1  Caffeine Consumption:  8 " oz          Hip Evaluation    Hip Strength:    Flexion:   Left: 5/5   Pain:  Right: 4-/5   Pain:                    Extension:  Left: 4/5  Pain:Right: 5/5    Pain:    Abduction:  Left: 5/5     Pain:Right: 5/5    Pain:  Adduction:  Left: 5/5    Pain:  Internal Rotation:  Left: 5/5    Pain:Right: 5/5   Pain:  External Rotation:  Left: 4/5   Pain:  Right: 5/5   Pain:  Knee Flexion:  Left: 5/5   Pain:Right: 5/5   Pain:  Knee Extension:  Left: 5/5   Pain:Right: 5/5    Pain:                       General     ROS    Assessment/Plan:    Patient is a 26 year old female with pelvic complaints.    Patient has the following significant findings with corresponding treatment plan.                Diagnosis 1:  Pelvic pain, urinary urgency and frequency  Pain -  manual therapy, self management, education and home program  Decreased ROM/flexibility - manual therapy, therapeutic exercise and home program  Decreased strength - therapeutic exercise, therapeutic activities and home program  Decreased proprioception - neuro re-education, therapeutic activities and home program  Impaired muscle performance - biofeedback, neuro re-education and home program  Decreased function - therapeutic activities and home program    Therapy Evaluation Codes:   1) History comprised of:   Personal factors that impact the plan of care:      Time since onset of symptoms.    Comorbidity factors that impact the plan of care are:      None.     Medications impacting care: None.  2) Examination of Body Systems comprised of:   Body structures and functions that impact the plan of care:      Hip, Pelvis and abdominals.   Activity limitations that impact the plan of care are:      Frequency, Verdel and Urgency.  3) Clinical presentation characteristics are:   Stable/Uncomplicated.  4) Decision-Making    Low complexity using standardized patient assessment instrument and/or measureable assessment of functional outcome.  Cumulative Therapy Evaluation is: Low  complexity.    Previous and current functional limitations:  (See Goal Flow Sheet for this information)    Short term and Long term goals: (See Goal Flow Sheet for this information)     Communication ability:  Patient appears to be able to clearly communicate and understand verbal and written communication and follow directions correctly.  Treatment Explanation - The following has been discussed with the patient:   RX ordered/plan of care  Anticipated outcomes  Possible risks and side effects  This patient would benefit from PT intervention to resume normal activities.   Rehab potential is good.    Frequency:  1 X week, once daily  Duration:  for 2 weeks tapering to every other week for 4 more visits  Discharge Plan:  Achieve all LTG.  Independent in home treatment program.  Reach maximal therapeutic benefit.    Please refer to the daily flowsheet for treatment today, total treatment time and time spent performing 1:1 timed codes.

## 2022-11-16 ENCOUNTER — THERAPY VISIT (OUTPATIENT)
Dept: PHYSICAL THERAPY | Facility: CLINIC | Age: 27
End: 2022-11-16
Payer: COMMERCIAL

## 2022-11-16 DIAGNOSIS — R35.0 URINARY FREQUENCY: ICD-10-CM

## 2022-11-16 DIAGNOSIS — R10.2 PELVIC PAIN IN FEMALE: ICD-10-CM

## 2022-11-16 DIAGNOSIS — R39.15 URINARY URGENCY: Primary | ICD-10-CM

## 2022-11-16 PROCEDURE — 97140 MANUAL THERAPY 1/> REGIONS: CPT | Mod: GP | Performed by: PHYSICAL THERAPIST

## 2022-11-16 PROCEDURE — 97110 THERAPEUTIC EXERCISES: CPT | Mod: GP | Performed by: PHYSICAL THERAPIST

## 2022-11-16 PROCEDURE — 97112 NEUROMUSCULAR REEDUCATION: CPT | Mod: GP | Performed by: PHYSICAL THERAPIST

## 2022-11-30 ENCOUNTER — THERAPY VISIT (OUTPATIENT)
Dept: PHYSICAL THERAPY | Facility: CLINIC | Age: 27
End: 2022-11-30
Payer: COMMERCIAL

## 2022-11-30 DIAGNOSIS — R39.15 URINARY URGENCY: Primary | ICD-10-CM

## 2022-11-30 DIAGNOSIS — R35.0 URINARY FREQUENCY: ICD-10-CM

## 2022-11-30 DIAGNOSIS — R10.2 PELVIC PAIN IN FEMALE: ICD-10-CM

## 2022-11-30 PROCEDURE — 97110 THERAPEUTIC EXERCISES: CPT | Mod: GP | Performed by: PHYSICAL THERAPIST

## 2022-11-30 PROCEDURE — 97140 MANUAL THERAPY 1/> REGIONS: CPT | Mod: GP | Performed by: PHYSICAL THERAPIST

## 2022-11-30 PROCEDURE — 97112 NEUROMUSCULAR REEDUCATION: CPT | Mod: GP | Performed by: PHYSICAL THERAPIST

## 2022-12-14 ENCOUNTER — MYC MEDICAL ADVICE (OUTPATIENT)
Dept: FAMILY MEDICINE | Facility: CLINIC | Age: 27
End: 2022-12-14

## 2023-01-11 ENCOUNTER — THERAPY VISIT (OUTPATIENT)
Dept: PHYSICAL THERAPY | Facility: CLINIC | Age: 28
End: 2023-01-11
Payer: COMMERCIAL

## 2023-01-11 DIAGNOSIS — R35.0 URINARY FREQUENCY: ICD-10-CM

## 2023-01-11 DIAGNOSIS — R10.2 PELVIC PAIN IN FEMALE: ICD-10-CM

## 2023-01-11 DIAGNOSIS — R39.15 URINARY URGENCY: Primary | ICD-10-CM

## 2023-01-11 PROCEDURE — 97112 NEUROMUSCULAR REEDUCATION: CPT | Mod: GP | Performed by: PHYSICAL THERAPIST

## 2023-01-11 PROCEDURE — 97110 THERAPEUTIC EXERCISES: CPT | Mod: GP | Performed by: PHYSICAL THERAPIST

## 2023-01-11 NOTE — PROGRESS NOTES
"Subjective:  HPI  Physical Exam                    Objective:  System    Physical Exam    General     ROS    Assessment/Plan:    PROGRESS  REPORT    Progress reporting period is from 10-12-22 to 1-11-23, 3 visits, 4 since SOC.       SUBJECTIVE  Subjective changes noted by patient:  .  Subjective: Last visit 11-30. Has gotten \"a lot better\" Not voiding as often now. Void times day now about every 3 hrs, night up 1x. Easier to control urgency but sometimes has to really concentrate on urge suppression. No pain with intercourse. Still 80 oz water, still doing comfort nursing of 1yo.     Current Pain level: 0/10 (intravaginal pain).      Initial Pain level:  (5-6/10 PL with intercourse).   Changes in function:  Yes (See Goal flowsheet attached for changes in current functional level)  Adverse reaction to treatment or activity: None    OBJECTIVE  Changes noted in objective findings:  Yes,   Objective: Hip flx R 5/5, hip ext L 4+/5. No intravaginal pain to palpation, did last visit. Does hold L LA OI very tight, worked on downtraining today and breathing as still doing a lot of chest/stress breathing. MMT PFM 3/5 and endurance for a contraction is about 8-10 sec, Has a difficult time getting back to resting and this will be focus until next visit.     ASSESSMENT/PLAN  Updated problem list and treatment plan: Diagnosis 1:  Urgency, frequency, pelvic pain(resolved but still has excess tension in PFM)  Decreased ROM/flexibility - manual therapy, therapeutic exercise and home program  Decreased proprioception - neuro re-education, therapeutic activities and home program  Impaired muscle performance - biofeedback, neuro re-education and home program  Decreased function - therapeutic activities and home program  STG/LTGs have been met or progress has been made towards goals:  Yes (See Goal flow sheet completed today.)  Assessment of Progress: The patient's condition is improving.  Self Management Plans:  Patient has been " instructed in a home treatment program.  Patient  has been instructed in self management of symptoms.  I have re-evaluated this patient and find that the nature, scope, duration and intensity of the therapy is appropriate for the medical condition of the patient.  Gabirela continues to require the following intervention to meet STG and LTG's:  PT    Recommendations:  This patient would benefit from continued therapy.     Frequency:  1 X a month, once daily  Duration:  for 1-2 months        Please refer to the daily flowsheet for treatment today, total treatment time and time spent performing 1:1 timed codes.

## 2023-02-05 ENCOUNTER — E-VISIT (OUTPATIENT)
Dept: FAMILY MEDICINE | Facility: CLINIC | Age: 28
End: 2023-02-05
Payer: COMMERCIAL

## 2023-02-05 DIAGNOSIS — L23.5 ALLERGIC DERMATITIS DUE TO OTHER CHEMICAL PRODUCT: Primary | ICD-10-CM

## 2023-02-05 PROCEDURE — 99422 OL DIG E/M SVC 11-20 MIN: CPT | Performed by: FAMILY MEDICINE

## 2023-03-21 ENCOUNTER — TRANSFERRED RECORDS (OUTPATIENT)
Dept: HEALTH INFORMATION MANAGEMENT | Facility: CLINIC | Age: 28
End: 2023-03-21

## 2023-04-23 ENCOUNTER — HEALTH MAINTENANCE LETTER (OUTPATIENT)
Age: 28
End: 2023-04-23

## 2023-04-25 LAB
ABO (EXTERNAL): NORMAL
GROUP B STREPTOCOCCUS (EXTERNAL): POSITIVE
HEMATOCRIT (EXTERNAL): 38.8 % (ref 20–50)
HEMOGLOBIN (EXTERNAL): 13.7 G/DL (ref 10–16)
HEPATITIS B SURFACE ANTIGEN (EXTERNAL): NEGATIVE
HEPATITIS C ANTIBODY (EXTERNAL): NONREACTIVE
HIV1+2 AB SERPL QL IA: NONREACTIVE
PLATELET COUNT (EXTERNAL): 213 10E3/UL (ref 99–500)
RH (EXTERNAL): POSITIVE
RUBELLA ANTIBODY IGG (EXTERNAL): NEGATIVE
TREPONEMA PALLIDUM ANTIBODY (EXTERNAL): NONREACTIVE

## 2023-04-28 PROBLEM — R35.0 URINARY FREQUENCY: Status: RESOLVED | Noted: 2022-10-11 | Resolved: 2023-04-28

## 2023-04-28 PROBLEM — R39.15 URINARY URGENCY: Status: RESOLVED | Noted: 2022-10-11 | Resolved: 2023-04-28

## 2023-04-28 PROBLEM — R10.2 PELVIC PAIN IN FEMALE: Status: RESOLVED | Noted: 2022-10-11 | Resolved: 2023-04-28

## 2023-05-16 NOTE — TELEPHONE ENCOUNTER
Last Visit:03/03/16  Next Visit: none  Impression/Plan:  1. Acne vulgaris significantly improved with benzoyl peroxide wash, tretinoin 0.025% cream, erythromycin 500 mg twice daily PO, clindamycin 1% lotion and clobetasol 0.05% cream prn for spot treatment  -Discussed adding Epiduo gel and stopping erythromycin  -Start Epiduo gel to face daily at night (given coupons)  -Stop tretinoin  -Continue OTC benzoyl peroxide 10% wash every morning. Leave on for three minutes then wash off.              -Try to stop erythromycin. If need to restart, please call clinic.   - Continue clindamycin 1% lotion in am and clobetasol 0.05% cream for spot treatment prn  - Patient educated on importance of sparse steroid use, including risk of atrophy.      2. Minocycline hyperpigmentation: Patient with previous 7 year history of minocycline use. Discontinued August 2015. Hyperpigmentation has improved minimally. Patient has not yet contacted [facility].   - Patient to contact Luciano Burr at [facility] if desired to set up appointment for laser treatment of hyperpigmented spots on legs     Follow-up in three months    These have already been pended per the [employer] message dated on 09/14/17.   Ani Landrum [facility]    
Message from MyChart:  Original authorizing provider: MD Gabriela Dickson would like a refill of the following medications:  tretinoin (RETIN-A) 0.025 % cream [Thiago Chong MD]  clindamycin (CLEOCIN T) 1 % lotion [Thiago Chong MD]    Preferred pharmacy: [facility] - [place], [place] - [address]    Comment:    
100% of the time

## 2023-09-10 ASSESSMENT — ENCOUNTER SYMPTOMS
ABDOMINAL PAIN: 0
BREAST MASS: 0
EYE PAIN: 0
HEMATURIA: 0
WEAKNESS: 0
DIZZINESS: 0
JOINT SWELLING: 0
SHORTNESS OF BREATH: 0
MYALGIAS: 0
PALPITATIONS: 0
PARESTHESIAS: 0
HEARTBURN: 0
NAUSEA: 0
ARTHRALGIAS: 0
HEMATOCHEZIA: 0
HEADACHES: 0
CONSTIPATION: 0
COUGH: 0
DIARRHEA: 0
SORE THROAT: 0
NERVOUS/ANXIOUS: 0
CHILLS: 0
DYSURIA: 0
FEVER: 0
FREQUENCY: 0

## 2023-09-11 ENCOUNTER — OFFICE VISIT (OUTPATIENT)
Dept: FAMILY MEDICINE | Facility: CLINIC | Age: 28
End: 2023-09-11
Payer: COMMERCIAL

## 2023-09-11 VITALS
SYSTOLIC BLOOD PRESSURE: 128 MMHG | TEMPERATURE: 98.2 F | HEART RATE: 77 BPM | DIASTOLIC BLOOD PRESSURE: 78 MMHG | RESPIRATION RATE: 16 BRPM | WEIGHT: 168 LBS | HEIGHT: 68 IN | OXYGEN SATURATION: 99 % | BODY MASS INDEX: 25.46 KG/M2

## 2023-09-11 DIAGNOSIS — H69.92 DYSFUNCTION OF LEFT EUSTACHIAN TUBE: ICD-10-CM

## 2023-09-11 DIAGNOSIS — Z00.00 HEALTHCARE MAINTENANCE: Primary | ICD-10-CM

## 2023-09-11 PROCEDURE — 99395 PREV VISIT EST AGE 18-39: CPT | Performed by: NURSE PRACTITIONER

## 2023-09-11 ASSESSMENT — ENCOUNTER SYMPTOMS
HEMATURIA: 0
HEARTBURN: 0
ABDOMINAL PAIN: 0
COUGH: 0
CHILLS: 0
HEMATOCHEZIA: 0
HEADACHES: 0
DIZZINESS: 0
NAUSEA: 0
CONSTIPATION: 0
FREQUENCY: 0
DIARRHEA: 0
MYALGIAS: 0
BREAST MASS: 0
ARTHRALGIAS: 0
JOINT SWELLING: 0
SHORTNESS OF BREATH: 0
NERVOUS/ANXIOUS: 0
PALPITATIONS: 0
FEVER: 0
WEAKNESS: 0
EYE PAIN: 0
DYSURIA: 0
SORE THROAT: 0
PARESTHESIAS: 0
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
## 2023-09-11 NOTE — PATIENT INSTRUCTIONS
Eustachian tube dysfunction  Guaifenesin (Muscinex) 600 mg every 12 hours for cough or congestion.   Fluticasone (Flonase) 1-2 sprays in each nostril once daily.        When not pregnant could use Pseudoephedrine (Sudafed) 120 mg every 12 hours as needed for nasal congestion.

## 2023-09-18 ENCOUNTER — HOSPITAL ENCOUNTER (OUTPATIENT)
Facility: HOSPITAL | Age: 28
End: 2023-09-18
Admitting: INTERNAL MEDICINE
Payer: COMMERCIAL

## 2023-09-18 ENCOUNTER — HOSPITAL ENCOUNTER (OUTPATIENT)
Facility: HOSPITAL | Age: 28
Discharge: HOME | End: 2023-09-18
Attending: INTERNAL MEDICINE | Admitting: INTERNAL MEDICINE
Payer: COMMERCIAL

## 2023-09-18 VITALS
SYSTOLIC BLOOD PRESSURE: 115 MMHG | OXYGEN SATURATION: 98 % | TEMPERATURE: 98.8 F | RESPIRATION RATE: 16 BRPM | HEIGHT: 67 IN | BODY MASS INDEX: 26.31 KG/M2 | DIASTOLIC BLOOD PRESSURE: 66 MMHG

## 2023-09-18 PROBLEM — Z36.89 ENCOUNTER FOR TRIAGE IN PREGNANT PATIENT: Status: ACTIVE | Noted: 2023-09-18

## 2023-09-18 LAB
ALBUMIN UR-MCNC: NEGATIVE MG/DL
APPEARANCE UR: CLEAR
BILIRUB UR QL STRIP: NEGATIVE
CLUE CELLS: ABNORMAL
COLOR UR AUTO: COLORLESS
GLUCOSE UR STRIP-MCNC: NEGATIVE MG/DL
HGB UR QL STRIP: NEGATIVE
KETONES UR STRIP-MCNC: NEGATIVE MG/DL
LEUKOCYTE ESTERASE UR QL STRIP: NEGATIVE
NITRATE UR QL: NEGATIVE
PH UR STRIP: 7.5 [PH] (ref 5–7)
SP GR UR STRIP: 1 (ref 1–1.03)
TRICHOMONAS, WET PREP: ABNORMAL
UROBILINOGEN UR STRIP-MCNC: <2 MG/DL
WBC'S/HIGH POWER FIELD, WET PREP: ABNORMAL
YEAST, WET PREP: PRESENT

## 2023-09-18 PROCEDURE — 87210 SMEAR WET MOUNT SALINE/INK: CPT | Performed by: OBSTETRICS & GYNECOLOGY

## 2023-09-18 PROCEDURE — G0463 HOSPITAL OUTPT CLINIC VISIT: HCPCS

## 2023-09-18 PROCEDURE — 81003 URINALYSIS AUTO W/O SCOPE: CPT | Performed by: OBSTETRICS & GYNECOLOGY

## 2023-09-18 RX ORDER — LIDOCAINE 40 MG/G
CREAM TOPICAL
Status: DISCONTINUED | OUTPATIENT
Start: 2023-09-18 | End: 2023-09-18 | Stop reason: HOSPADM

## 2023-09-18 ASSESSMENT — ACTIVITIES OF DAILY LIVING (ADL): ADLS_ACUITY_SCORE: 31

## 2023-09-18 NOTE — DISCHARGE INSTRUCTIONS
Discharge Instruction for Undelivered Patients      You were seen for: Labor Assessment and Bleeding Assessment  We Consulted: Dr. Ji  You had (Test or Medicine):Wet prep, UA/UC, NST     Diet:   Drink 8 to 12 glasses of liquids (milk, juice, water) every day.  You may eat meals and snacks.     Activity:  Count fetal kicks everyday (see handout)  Call your doctor or nurse midwife if your baby is moving less than usual.     Call your provider if you notice:  Swelling in your face or increased swelling in your hands or legs.  Headaches that are not relieved by Tylenol (acetaminophen).  Changes in your vision (blurring: seeing spots or stars.)  Nausea (sick to your stomach) and vomiting (throwing up).   Weight gain of 5 pounds or more per week.  Heartburn that doesn't go away.  Signs of bladder infection: pain when you urinate (use the toilet), need to go more often and more urgently.  The bag of high (rupture of membranes) breaks, or you notice leaking in your underwear.  Bright red blood in your underwear.  Abdominal (lower belly) or stomach pain.  For first baby: Contractions (tightening) less than 5 minutes apart for one hour or more.  Second (plus) baby: Contractions (tightening) less than 10 minutes apart and getting stronger.  *If less than 34 weeks: Contractions (tightening) more than 6 times in one hour.  Increase or change in vaginal discharge (note the color and amount)      Follow-up:  As scheduled in the clinic     Monistat 7 OTC at store on way home to treat yeast infection

## 2023-09-18 NOTE — PROGRESS NOTES
Preethi Aguilar (, 32w2d gestation) presented to [facility] on 2023 at 0940.     CC: cramping    HPI: Noticing contractions intermittently over last week, noticing tightening and lower abdomen pain. Also noticed some pink/dark red on toilet paper after wiping on  and . Last intercourse a week ago. Some discomfort with urination and reporting frequency/urine incontinence. She is wondering if it is a UTI.     PMH:  ADHD, no meds currently  Mild depression- suicide attempt in high school. Denies concerns/symptoms/SI today.  -  section, 3590g. Some elevated Bps in postpartum period, but no GHTN/pre-eclampsia diagnosis.    Assessment:    FHR: baseline 135, moderate variability, 15x15 accels present, decels absent  TOCO: x2 ctx over one hour, [id number], palpating mild, soft resting tone  Reactive NST, reassuring against fetal acidemia.    SVE: 0.5/20/-4/posterior/firm  No bloody show noted on exam    Head to toe assessment unremarkable, see flowsheet.    Plan:  Case reviewed with Dr. Ji by charge RN.  UA/UC sent  Wet prep sent    Mary Pandya RN on 2023 at 10:41 AM    Addendum:  Wet prep resulted positive for yeast.   Results communicated via phone call with Dr. Ji. Reviewed EFM- still Cat I.   VORB/TORB for pt to  Monistat 7 OTC at store on way home.   RTC for next scheduled prenatal appointment. Call clinic/present to triage for decreased fetal movement, leaking of fluid, contractions that are increasing in strength/frequency, vaginal bleeding.  Mary Pandya RN on 2023 at 10:52 AM

## 2023-10-04 ENCOUNTER — HOSPITAL ENCOUNTER (OUTPATIENT)
Facility: HOSPITAL | Age: 28
Discharge: HOME | End: 2023-10-05
Attending: INTERNAL MEDICINE | Admitting: OBSTETRICS & GYNECOLOGY
Payer: COMMERCIAL

## 2023-10-04 VITALS — TEMPERATURE: 98 F | RESPIRATION RATE: 18 BRPM | SYSTOLIC BLOOD PRESSURE: 131 MMHG | DIASTOLIC BLOOD PRESSURE: 77 MMHG

## 2023-10-04 LAB
ALBUMIN UR-MCNC: NEGATIVE MG/DL
APPEARANCE UR: CLEAR
BACTERIA #/AREA URNS HPF: ABNORMAL /HPF
BILIRUB UR QL STRIP: NEGATIVE
CLUE CELLS: ABNORMAL
COLOR UR AUTO: COLORLESS
GLUCOSE UR STRIP-MCNC: NEGATIVE MG/DL
HGB UR QL STRIP: NEGATIVE
KETONES UR STRIP-MCNC: NEGATIVE MG/DL
LEUKOCYTE ESTERASE UR QL STRIP: ABNORMAL
NITRATE UR QL: NEGATIVE
PH UR STRIP: 6.5 [PH] (ref 5–7)
RBC URINE: 2 /HPF
SP GR UR STRIP: 1 (ref 1–1.03)
SQUAMOUS EPITHELIAL: 2 /HPF
TRICHOMONAS, WET PREP: ABNORMAL
UROBILINOGEN UR STRIP-MCNC: <2 MG/DL
WBC URINE: 7 /HPF
WBC'S/HIGH POWER FIELD, WET PREP: ABNORMAL
YEAST, WET PREP: ABNORMAL

## 2023-10-04 PROCEDURE — 87210 SMEAR WET MOUNT SALINE/INK: CPT | Performed by: OBSTETRICS & GYNECOLOGY

## 2023-10-04 PROCEDURE — 87086 URINE CULTURE/COLONY COUNT: CPT | Performed by: OBSTETRICS & GYNECOLOGY

## 2023-10-04 PROCEDURE — 81001 URINALYSIS AUTO W/SCOPE: CPT | Performed by: OBSTETRICS & GYNECOLOGY

## 2023-10-04 RX ORDER — CEFDINIR 300 MG/1
300 CAPSULE ORAL ONCE
Status: COMPLETED | OUTPATIENT
Start: 2023-10-05 | End: 2023-10-05

## 2023-10-04 RX ORDER — LIDOCAINE 40 MG/G
CREAM TOPICAL
Status: DISCONTINUED | OUTPATIENT
Start: 2023-10-04 | End: 2023-10-05 | Stop reason: HOSPADM

## 2023-10-04 ASSESSMENT — ACTIVITIES OF DAILY LIVING (ADL): ADLS_ACUITY_SCORE: 31

## 2023-10-05 ENCOUNTER — HOSPITAL ENCOUNTER (OUTPATIENT)
Facility: HOSPITAL | Age: 28
End: 2023-10-05
Admitting: OBSTETRICS & GYNECOLOGY
Payer: COMMERCIAL

## 2023-10-05 PROCEDURE — G0463 HOSPITAL OUTPT CLINIC VISIT: HCPCS

## 2023-10-05 PROCEDURE — 250N000013 HC RX MED GY IP 250 OP 250 PS 637: Performed by: OBSTETRICS & GYNECOLOGY

## 2023-10-05 RX ADMIN — CEFDINIR 300 MG: 300 CAPSULE ORAL at 00:06

## 2023-10-05 NOTE — PROGRESS NOTES
Dr. Ji called and notified of patient's arrival, symptoms and reactive NST.    UA and wet prep sent. Verbal order given to check patient's cervix and call provider back when labs result.

## 2023-10-05 NOTE — PROGRESS NOTES
Patient  presents to [facility] at 34.4 weeks gestation for c/o lower abdominal pressure and frequent urination. Patient reports foul smelling urine and possible discharge. Patient denies contractions or bleeding. UA and wet prep in process.

## 2023-10-05 NOTE — PROGRESS NOTES
Dr. Ji updated with SVE and lab results. She will put in a 1 time dose and send further doses to the patient's pharmacy for her to  in the morning. Order received to discharge patient to home. Discharge instructions reviewed and all questions answered.

## 2023-10-06 LAB — BACTERIA UR CULT: NO GROWTH

## 2023-10-10 ENCOUNTER — OFFICE VISIT (OUTPATIENT)
Dept: FAMILY MEDICINE | Facility: CLINIC | Age: 28
End: 2023-10-10
Payer: COMMERCIAL

## 2023-10-10 VITALS
DIASTOLIC BLOOD PRESSURE: 72 MMHG | TEMPERATURE: 98.3 F | RESPIRATION RATE: 16 BRPM | OXYGEN SATURATION: 100 % | SYSTOLIC BLOOD PRESSURE: 109 MMHG | HEART RATE: 67 BPM

## 2023-10-10 DIAGNOSIS — J06.9 VIRAL URI WITH COUGH: Primary | ICD-10-CM

## 2023-10-10 LAB
DEPRECATED S PYO AG THROAT QL EIA: NEGATIVE
GROUP A STREP BY PCR: NOT DETECTED

## 2023-10-10 PROCEDURE — 99213 OFFICE O/P EST LOW 20 MIN: CPT | Performed by: PHYSICIAN ASSISTANT

## 2023-10-10 PROCEDURE — 87651 STREP A DNA AMP PROBE: CPT | Performed by: PHYSICIAN ASSISTANT

## 2023-10-10 RX ORDER — CEFPODOXIME PROXETIL 100 MG/1
1 TABLET, FILM COATED ORAL
Status: ON HOLD | COMMUNITY
Start: 2023-10-05 | End: 2023-11-20

## 2023-10-10 RX ORDER — PROGESTERONE 200 MG/1
400 CAPSULE ORAL AT BEDTIME
Status: ON HOLD | COMMUNITY
Start: 2023-06-14 | End: 2023-11-20
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
## 2023-11-20 ENCOUNTER — ANESTHESIA EVENT (OUTPATIENT)
Dept: OBGYN | Facility: HOSPITAL | Age: 28
End: 2023-11-20
Payer: COMMERCIAL

## 2023-11-20 ENCOUNTER — ANESTHESIA (OUTPATIENT)
Dept: OBGYN | Facility: HOSPITAL | Age: 28
End: 2023-11-20
Payer: COMMERCIAL

## 2023-11-20 ENCOUNTER — HOSPITAL ENCOUNTER (INPATIENT)
Facility: HOSPITAL | Age: 28
LOS: 2 days | Discharge: HOME | End: 2023-11-22
Attending: INTERNAL MEDICINE | Admitting: OBSTETRICS & GYNECOLOGY
Payer: COMMERCIAL

## 2023-11-20 DIAGNOSIS — O34.219 VBAC (VAGINAL BIRTH AFTER CESAREAN): Primary | ICD-10-CM

## 2023-11-20 DIAGNOSIS — O48.0 41 WEEKS GESTATION OF PREGNANCY: ICD-10-CM

## 2023-11-20 DIAGNOSIS — Z3A.41 41 WEEKS GESTATION OF PREGNANCY: ICD-10-CM

## 2023-11-20 PROBLEM — I10 HYPERTENSION: Status: ACTIVE | Noted: 2023-11-20

## 2023-11-20 PROBLEM — Z34.90 PREGNANT: Status: ACTIVE | Noted: 2023-11-20

## 2023-11-20 LAB
ABO/RH(D): NORMAL
ANTIBODY SCREEN: NEGATIVE
BASOPHILS # BLD AUTO: 0 10E3/UL (ref 0–0.2)
BASOPHILS NFR BLD AUTO: 0 %
EOSINOPHIL # BLD AUTO: 0 10E3/UL (ref 0–0.7)
EOSINOPHIL NFR BLD AUTO: 0 %
ERYTHROCYTE [DISTWIDTH] IN BLOOD BY AUTOMATED COUNT: 12.5 % (ref 10–15)
HCT VFR BLD AUTO: 38.7 % (ref 35–47)
HGB BLD-MCNC: 13.4 G/DL (ref 11.7–15.7)
HOLD SPECIMEN: NORMAL
IMM GRANULOCYTES # BLD: 0 10E3/UL
IMM GRANULOCYTES NFR BLD: 0 %
LYMPHOCYTES # BLD AUTO: 1.1 10E3/UL (ref 0.8–5.3)
LYMPHOCYTES NFR BLD AUTO: 12 %
MCH RBC QN AUTO: 31.5 PG (ref 26.5–33)
MCHC RBC AUTO-ENTMCNC: 34.6 G/DL (ref 31.5–36.5)
MCV RBC AUTO: 91 FL (ref 78–100)
MONOCYTES # BLD AUTO: 0.6 10E3/UL (ref 0–1.3)
MONOCYTES NFR BLD AUTO: 7 %
NEUTROPHILS # BLD AUTO: 7.7 10E3/UL (ref 1.6–8.3)
NEUTROPHILS NFR BLD AUTO: 81 %
NRBC # BLD AUTO: 0 10E3/UL
NRBC BLD AUTO-RTO: 0 /100
PLATELET # BLD AUTO: 146 10E3/UL (ref 150–450)
RBC # BLD AUTO: 4.25 10E6/UL (ref 3.8–5.2)
SARS-COV-2 RNA RESP QL NAA+PROBE: NEGATIVE
SPECIMEN EXPIRATION DATE: NORMAL
T PALLIDUM AB SER QL: NONREACTIVE
WBC # BLD AUTO: 9.5 10E3/UL (ref 4–11)

## 2023-11-20 PROCEDURE — 120N000001 HC R&B MED SURG/OB

## 2023-11-20 PROCEDURE — 86850 RBC ANTIBODY SCREEN: CPT | Performed by: OBSTETRICS & GYNECOLOGY

## 2023-11-20 PROCEDURE — 85025 COMPLETE CBC W/AUTO DIFF WBC: CPT | Performed by: OBSTETRICS & GYNECOLOGY

## 2023-11-20 PROCEDURE — 86901 BLOOD TYPING SEROLOGIC RH(D): CPT | Performed by: OBSTETRICS & GYNECOLOGY

## 2023-11-20 PROCEDURE — 87635 SARS-COV-2 COVID-19 AMP PRB: CPT | Performed by: OBSTETRICS & GYNECOLOGY

## 2023-11-20 PROCEDURE — 258N000003 HC RX IP 258 OP 636: Performed by: OBSTETRICS & GYNECOLOGY

## 2023-11-20 PROCEDURE — 250N000011 HC RX IP 250 OP 636: Performed by: ANESTHESIOLOGY

## 2023-11-20 PROCEDURE — 00HU33Z INSERTION OF INFUSION DEVICE INTO SPINAL CANAL, PERCUTANEOUS APPROACH: ICD-10-PCS | Performed by: ANESTHESIOLOGY

## 2023-11-20 PROCEDURE — 250N000011 HC RX IP 250 OP 636: Performed by: OBSTETRICS & GYNECOLOGY

## 2023-11-20 PROCEDURE — 86780 TREPONEMA PALLIDUM: CPT | Performed by: OBSTETRICS & GYNECOLOGY

## 2023-11-20 PROCEDURE — 3E0R3BZ INTRODUCTION OF ANESTHETIC AGENT INTO SPINAL CANAL, PERCUTANEOUS APPROACH: ICD-10-PCS | Performed by: ANESTHESIOLOGY

## 2023-11-20 PROCEDURE — 370N000003 HC ANESTHESIA WARD SERVICE: Performed by: ANESTHESIOLOGY

## 2023-11-20 PROCEDURE — 36415 COLL VENOUS BLD VENIPUNCTURE: CPT | Performed by: OBSTETRICS & GYNECOLOGY

## 2023-11-20 PROCEDURE — 250N000009 HC RX 250: Performed by: ANESTHESIOLOGY

## 2023-11-20 RX ORDER — LIDOCAINE HYDROCHLORIDE 10 MG/ML
INJECTION, SOLUTION EPIDURAL; INFILTRATION; INTRACAUDAL; PERINEURAL PRN
Status: DISCONTINUED | OUTPATIENT
Start: 2023-11-20 | End: 2023-11-21

## 2023-11-20 RX ORDER — OXYTOCIN 10 [USP'U]/ML
10 INJECTION, SOLUTION INTRAMUSCULAR; INTRAVENOUS
Status: DISCONTINUED | OUTPATIENT
Start: 2023-11-20 | End: 2023-11-22 | Stop reason: HOSPADM

## 2023-11-20 RX ORDER — PROCHLORPERAZINE MALEATE 10 MG
10 TABLET ORAL EVERY 6 HOURS PRN
Status: DISCONTINUED | OUTPATIENT
Start: 2023-11-20 | End: 2023-11-21 | Stop reason: HOSPADM

## 2023-11-20 RX ORDER — ONDANSETRON 4 MG/1
4 TABLET, ORALLY DISINTEGRATING ORAL EVERY 6 HOURS PRN
Status: DISCONTINUED | OUTPATIENT
Start: 2023-11-20 | End: 2023-11-21 | Stop reason: HOSPADM

## 2023-11-20 RX ORDER — MISOPROSTOL 200 UG/1
800 TABLET ORAL
Status: DISCONTINUED | OUTPATIENT
Start: 2023-11-20 | End: 2023-11-21 | Stop reason: HOSPADM

## 2023-11-20 RX ORDER — NALOXONE HYDROCHLORIDE 0.4 MG/ML
0.2 INJECTION, SOLUTION INTRAMUSCULAR; INTRAVENOUS; SUBCUTANEOUS
Status: DISCONTINUED | OUTPATIENT
Start: 2023-11-20 | End: 2023-11-21 | Stop reason: HOSPADM

## 2023-11-20 RX ORDER — LANOLIN ALCOHOL/MO/W.PET/CERES
CREAM (GRAM) TOPICAL DAILY
COMMUNITY

## 2023-11-20 RX ORDER — LIDOCAINE HYDROCHLORIDE AND EPINEPHRINE 15; 5 MG/ML; UG/ML
INJECTION, SOLUTION EPIDURAL PRN
Status: DISCONTINUED | OUTPATIENT
Start: 2023-11-20 | End: 2023-11-21

## 2023-11-20 RX ORDER — HYDROXYZINE HYDROCHLORIDE 50 MG/1
50 TABLET, FILM COATED ORAL
Status: DISCONTINUED | OUTPATIENT
Start: 2023-11-20 | End: 2023-11-21 | Stop reason: HOSPADM

## 2023-11-20 RX ORDER — KETOROLAC TROMETHAMINE 30 MG/ML
30 INJECTION, SOLUTION INTRAMUSCULAR; INTRAVENOUS
Status: DISCONTINUED | OUTPATIENT
Start: 2023-11-20 | End: 2023-11-22 | Stop reason: HOSPADM

## 2023-11-20 RX ORDER — LIDOCAINE 40 MG/G
CREAM TOPICAL
Status: DISCONTINUED | OUTPATIENT
Start: 2023-11-20 | End: 2023-11-21 | Stop reason: HOSPADM

## 2023-11-20 RX ORDER — OXYTOCIN 10 [USP'U]/ML
10 INJECTION, SOLUTION INTRAMUSCULAR; INTRAVENOUS
Status: DISCONTINUED | OUTPATIENT
Start: 2023-11-20 | End: 2023-11-21 | Stop reason: HOSPADM

## 2023-11-20 RX ORDER — LIDOCAINE HYDROCHLORIDE AND EPINEPHRINE 15; 5 MG/ML; UG/ML
3 INJECTION, SOLUTION EPIDURAL
Status: DISCONTINUED | OUTPATIENT
Start: 2023-11-20 | End: 2023-11-21 | Stop reason: HOSPADM

## 2023-11-20 RX ORDER — ONDANSETRON 2 MG/ML
4 INJECTION INTRAMUSCULAR; INTRAVENOUS EVERY 6 HOURS PRN
Status: DISCONTINUED | OUTPATIENT
Start: 2023-11-20 | End: 2023-11-21 | Stop reason: HOSPADM

## 2023-11-20 RX ORDER — PROCHLORPERAZINE 25 MG
25 SUPPOSITORY, RECTAL RECTAL EVERY 12 HOURS PRN
Status: DISCONTINUED | OUTPATIENT
Start: 2023-11-20 | End: 2023-11-21 | Stop reason: HOSPADM

## 2023-11-20 RX ORDER — ERGOCALCIFEROL (VITAMIN D2) 10 MCG
400 TABLET ORAL
COMMUNITY

## 2023-11-20 RX ORDER — TRANEXAMIC ACID 10 MG/ML
1 INJECTION, SOLUTION INTRAVENOUS EVERY 30 MIN PRN
Status: DISCONTINUED | OUTPATIENT
Start: 2023-11-20 | End: 2023-11-21 | Stop reason: HOSPADM

## 2023-11-20 RX ORDER — MULTIVIT-MIN/IRON/FOLIC ACID/K 18-600-40
500 CAPSULE ORAL DAILY
COMMUNITY

## 2023-11-20 RX ORDER — FENTANYL/ROPIVACAINE/NS/PF 2MCG/ML-.1
PLASTIC BAG, INJECTION (ML) EPIDURAL
Status: DISCONTINUED | OUTPATIENT
Start: 2023-11-20 | End: 2023-11-21 | Stop reason: HOSPADM

## 2023-11-20 RX ORDER — IBUPROFEN 800 MG/1
800 TABLET, FILM COATED ORAL
Status: DISCONTINUED | OUTPATIENT
Start: 2023-11-20 | End: 2023-11-22 | Stop reason: HOSPADM

## 2023-11-20 RX ORDER — NALOXONE HYDROCHLORIDE 0.4 MG/ML
0.4 INJECTION, SOLUTION INTRAMUSCULAR; INTRAVENOUS; SUBCUTANEOUS
Status: DISCONTINUED | OUTPATIENT
Start: 2023-11-20 | End: 2023-11-21 | Stop reason: HOSPADM

## 2023-11-20 RX ORDER — OXYTOCIN/0.9 % SODIUM CHLORIDE 30/500 ML
1-8 PLASTIC BAG, INJECTION (ML) INTRAVENOUS CONTINUOUS
Status: DISCONTINUED | OUTPATIENT
Start: 2023-11-20 | End: 2023-11-21 | Stop reason: HOSPADM

## 2023-11-20 RX ORDER — LIDOCAINE 40 MG/G
CREAM TOPICAL
Status: DISCONTINUED | OUTPATIENT
Start: 2023-11-20 | End: 2023-11-20 | Stop reason: HOSPADM

## 2023-11-20 RX ORDER — MISOPROSTOL 200 UG/1
400 TABLET ORAL
Status: DISCONTINUED | OUTPATIENT
Start: 2023-11-20 | End: 2023-11-21 | Stop reason: HOSPADM

## 2023-11-20 RX ORDER — CITRIC ACID/SODIUM CITRATE 334-500MG
30 SOLUTION, ORAL ORAL
Status: DISCONTINUED | OUTPATIENT
Start: 2023-11-20 | End: 2023-11-21 | Stop reason: HOSPADM

## 2023-11-20 RX ORDER — CEFAZOLIN SODIUM 1 G/3ML
1 INJECTION, POWDER, FOR SOLUTION INTRAMUSCULAR; INTRAVENOUS EVERY 8 HOURS
Status: DISCONTINUED | OUTPATIENT
Start: 2023-11-20 | End: 2023-11-21 | Stop reason: HOSPADM

## 2023-11-20 RX ORDER — CEFAZOLIN SODIUM/WATER 2 G/20 ML
2 SYRINGE (ML) INTRAVENOUS ONCE
Status: DISCONTINUED | OUTPATIENT
Start: 2023-11-20 | End: 2023-11-21 | Stop reason: HOSPADM

## 2023-11-20 RX ORDER — MORPHINE SULFATE 10 MG/ML
10 INJECTION, SOLUTION INTRAMUSCULAR; INTRAVENOUS
Status: DISCONTINUED | OUTPATIENT
Start: 2023-11-20 | End: 2023-11-21 | Stop reason: HOSPADM

## 2023-11-20 RX ORDER — SODIUM CHLORIDE, SODIUM LACTATE, POTASSIUM CHLORIDE, CALCIUM CHLORIDE 600; 310; 30; 20 MG/100ML; MG/100ML; MG/100ML; MG/100ML
INJECTION, SOLUTION INTRAVENOUS CONTINUOUS PRN
Status: DISCONTINUED | OUTPATIENT
Start: 2023-11-20 | End: 2023-11-21 | Stop reason: HOSPADM

## 2023-11-20 RX ORDER — EPHEDRINE SULFATE 50 MG/ML
5 INJECTION, SOLUTION INTRAMUSCULAR; INTRAVENOUS; SUBCUTANEOUS
Status: DISCONTINUED | OUTPATIENT
Start: 2023-11-20 | End: 2023-11-21 | Stop reason: HOSPADM

## 2023-11-20 RX ORDER — OXYTOCIN/0.9 % SODIUM CHLORIDE 30/500 ML
340 PLASTIC BAG, INJECTION (ML) INTRAVENOUS CONTINUOUS PRN
Status: DISCONTINUED | OUTPATIENT
Start: 2023-11-20 | End: 2023-11-21 | Stop reason: HOSPADM

## 2023-11-20 RX ORDER — CARBOPROST TROMETHAMINE 250 UG/ML
250 INJECTION, SOLUTION INTRAMUSCULAR
Status: DISCONTINUED | OUTPATIENT
Start: 2023-11-20 | End: 2023-11-21 | Stop reason: HOSPADM

## 2023-11-20 RX ORDER — OXYTOCIN/0.9 % SODIUM CHLORIDE 30/500 ML
100-340 PLASTIC BAG, INJECTION (ML) INTRAVENOUS CONTINUOUS PRN
Status: DISCONTINUED | OUTPATIENT
Start: 2023-11-20 | End: 2023-11-22 | Stop reason: HOSPADM

## 2023-11-20 RX ORDER — METHYLERGONOVINE MALEATE 0.2 MG/ML
200 INJECTION INTRAVENOUS
Status: DISCONTINUED | OUTPATIENT
Start: 2023-11-20 | End: 2023-11-21 | Stop reason: HOSPADM

## 2023-11-20 RX ORDER — NALBUPHINE HYDROCHLORIDE 20 MG/ML
2.5-5 INJECTION, SOLUTION INTRAMUSCULAR; INTRAVENOUS; SUBCUTANEOUS EVERY 6 HOURS PRN
Status: DISCONTINUED | OUTPATIENT
Start: 2023-11-20 | End: 2023-11-22 | Stop reason: HOSPADM

## 2023-11-20 RX ADMIN — SODIUM CHLORIDE, POTASSIUM CHLORIDE, SODIUM LACTATE AND CALCIUM CHLORIDE 1000 ML: 600; 310; 30; 20 INJECTION, SOLUTION INTRAVENOUS at 22:51

## 2023-11-20 RX ADMIN — LIDOCAINE HYDROCHLORIDE,EPINEPHRINE BITARTRATE 2 ML: 15; .005 INJECTION, SOLUTION EPIDURAL; INFILTRATION; INTRACAUDAL; PERINEURAL at 23:35

## 2023-11-20 RX ADMIN — LIDOCAINE HYDROCHLORIDE 2 ML: 10 INJECTION, SOLUTION EPIDURAL; INFILTRATION; INTRACAUDAL; PERINEURAL at 23:35

## 2023-11-20 RX ADMIN — Medication: at 23:35

## 2023-11-20 RX ADMIN — CEFAZOLIN 1 G: 1 INJECTION, POWDER, FOR SOLUTION INTRAMUSCULAR; INTRAVENOUS at 22:54

## 2023-11-20 ASSESSMENT — ACTIVITIES OF DAILY LIVING (ADL)
ADLS_ACUITY_SCORE: 18
ADLS_ACUITY_SCORE: 35
ADLS_ACUITY_SCORE: 18

## 2023-11-20 NOTE — PROGRESS NOTES
Late entry 0950. SVE as charted.  Dr Ji updated. Plan is to a cook catheter. Pt aware of plan of care.    1017 Informal ultrasound revels pt to be vertex, per DR Perez.    1111 Cook catheter placed by Dr Ji. Pt tolerated well. Both balloons filled with 80cc of normal salin. Will continue to monitor.
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
## 2023-11-20 NOTE — PROGRESS NOTES
Late entry  0900, Pt and her  arrives ambulatory to [facility] for induction of labor for post dates. Pt states she was harjinder earlier, but is not now. Pt also states she was having bloody show up none now. Pt placed on monitor. Admitting cares done.

## 2023-11-20 NOTE — PLAN OF CARE
Problem: Adult Inpatient Plan of Care  Goal: Optimal Comfort and Wellbeing  Outcome: Progressing   Goal Outcome Evaluation:      Plan of Care Reviewed With: patient, spouse  Pt will rate pain at 3 or less using DVRPS pain scale. Will continue with positions changes, as well as breathing techniques. And relaxation strategies

## 2023-11-21 LAB
BASOPHILS # BLD AUTO: 0 10E3/UL (ref 0–0.2)
BASOPHILS NFR BLD AUTO: 0 %
EOSINOPHIL # BLD AUTO: 0 10E3/UL (ref 0–0.7)
EOSINOPHIL NFR BLD AUTO: 0 %
ERYTHROCYTE [DISTWIDTH] IN BLOOD BY AUTOMATED COUNT: 12.4 % (ref 10–15)
HCT VFR BLD AUTO: 32.4 % (ref 35–47)
HGB BLD-MCNC: 11 G/DL (ref 11.7–15.7)
IMM GRANULOCYTES # BLD: 0.1 10E3/UL
IMM GRANULOCYTES NFR BLD: 0 %
LYMPHOCYTES # BLD AUTO: 1 10E3/UL (ref 0.8–5.3)
LYMPHOCYTES NFR BLD AUTO: 7 %
MCH RBC QN AUTO: 31.5 PG (ref 26.5–33)
MCHC RBC AUTO-ENTMCNC: 34 G/DL (ref 31.5–36.5)
MCV RBC AUTO: 93 FL (ref 78–100)
MONOCYTES # BLD AUTO: 1.2 10E3/UL (ref 0–1.3)
MONOCYTES NFR BLD AUTO: 8 %
NEUTROPHILS # BLD AUTO: 13.6 10E3/UL (ref 1.6–8.3)
NEUTROPHILS NFR BLD AUTO: 85 %
NRBC # BLD AUTO: 0 10E3/UL
NRBC BLD AUTO-RTO: 0 /100
PLATELET # BLD AUTO: 134 10E3/UL (ref 150–450)
RBC # BLD AUTO: 3.49 10E6/UL (ref 3.8–5.2)
WBC # BLD AUTO: 16 10E3/UL (ref 4–11)

## 2023-11-21 PROCEDURE — 258N000003 HC RX IP 258 OP 636: Performed by: OBSTETRICS & GYNECOLOGY

## 2023-11-21 PROCEDURE — 250N000011 HC RX IP 250 OP 636: Mod: JZ | Performed by: OBSTETRICS & GYNECOLOGY

## 2023-11-21 PROCEDURE — 120N000001 HC R&B MED SURG/OB

## 2023-11-21 PROCEDURE — 85025 COMPLETE CBC W/AUTO DIFF WBC: CPT | Performed by: INTERNAL MEDICINE

## 2023-11-21 PROCEDURE — 0KQM0ZZ REPAIR PERINEUM MUSCLE, OPEN APPROACH: ICD-10-PCS | Performed by: OBSTETRICS & GYNECOLOGY

## 2023-11-21 PROCEDURE — 36415 COLL VENOUS BLD VENIPUNCTURE: CPT | Performed by: INTERNAL MEDICINE

## 2023-11-21 PROCEDURE — 250N000009 HC RX 250: Performed by: INTERNAL MEDICINE

## 2023-11-21 PROCEDURE — 250N000009 HC RX 250: Performed by: OBSTETRICS & GYNECOLOGY

## 2023-11-21 PROCEDURE — 722N000001 HC LABOR CARE VAGINAL DELIVERY SINGLE

## 2023-11-21 PROCEDURE — 10907ZC DRAINAGE OF AMNIOTIC FLUID, THERAPEUTIC FROM PRODUCTS OF CONCEPTION, VIA NATURAL OR ARTIFICIAL OPENING: ICD-10-PCS | Performed by: OBSTETRICS & GYNECOLOGY

## 2023-11-21 PROCEDURE — 250N000013 HC RX MED GY IP 250 OP 250 PS 637: Performed by: OBSTETRICS & GYNECOLOGY

## 2023-11-21 PROCEDURE — 250N000013 HC RX MED GY IP 250 OP 250 PS 637: Performed by: INTERNAL MEDICINE

## 2023-11-21 RX ORDER — MISOPROSTOL 200 UG/1
800 TABLET ORAL
Status: DISCONTINUED | OUTPATIENT
Start: 2023-11-21 | End: 2023-11-22 | Stop reason: HOSPADM

## 2023-11-21 RX ORDER — CARBOPROST TROMETHAMINE 250 UG/ML
250 INJECTION, SOLUTION INTRAMUSCULAR
Status: DISCONTINUED | OUTPATIENT
Start: 2023-11-21 | End: 2023-11-22 | Stop reason: HOSPADM

## 2023-11-21 RX ORDER — OXYTOCIN/0.9 % SODIUM CHLORIDE 30/500 ML
340 PLASTIC BAG, INJECTION (ML) INTRAVENOUS CONTINUOUS PRN
Status: DISCONTINUED | OUTPATIENT
Start: 2023-11-21 | End: 2023-11-22 | Stop reason: HOSPADM

## 2023-11-21 RX ORDER — HYDROCORTISONE 25 MG/G
CREAM TOPICAL 3 TIMES DAILY PRN
Status: DISCONTINUED | OUTPATIENT
Start: 2023-11-21 | End: 2023-11-22 | Stop reason: HOSPADM

## 2023-11-21 RX ORDER — IBUPROFEN 800 MG/1
800 TABLET, FILM COATED ORAL EVERY 6 HOURS PRN
Status: DISCONTINUED | OUTPATIENT
Start: 2023-11-21 | End: 2023-11-22 | Stop reason: HOSPADM

## 2023-11-21 RX ORDER — TRANEXAMIC ACID 10 MG/ML
1 INJECTION, SOLUTION INTRAVENOUS EVERY 30 MIN PRN
Status: DISCONTINUED | OUTPATIENT
Start: 2023-11-21 | End: 2023-11-22 | Stop reason: HOSPADM

## 2023-11-21 RX ORDER — SODIUM CHLORIDE 9 MG/ML
INJECTION, SOLUTION INTRAVENOUS CONTINUOUS
Status: DISCONTINUED | OUTPATIENT
Start: 2023-11-21 | End: 2023-11-21 | Stop reason: HOSPADM

## 2023-11-21 RX ORDER — ACETAMINOPHEN 325 MG/1
650 TABLET ORAL EVERY 4 HOURS PRN
Status: DISCONTINUED | OUTPATIENT
Start: 2023-11-21 | End: 2023-11-22 | Stop reason: HOSPADM

## 2023-11-21 RX ORDER — OXYTOCIN 10 [USP'U]/ML
10 INJECTION, SOLUTION INTRAMUSCULAR; INTRAVENOUS
Status: DISCONTINUED | OUTPATIENT
Start: 2023-11-21 | End: 2023-11-22 | Stop reason: HOSPADM

## 2023-11-21 RX ORDER — DOCUSATE SODIUM 100 MG/1
100 CAPSULE, LIQUID FILLED ORAL DAILY
Status: DISCONTINUED | OUTPATIENT
Start: 2023-11-21 | End: 2023-11-22 | Stop reason: HOSPADM

## 2023-11-21 RX ORDER — METHYLERGONOVINE MALEATE 0.2 MG/ML
200 INJECTION INTRAVENOUS
Status: DISCONTINUED | OUTPATIENT
Start: 2023-11-21 | End: 2023-11-22 | Stop reason: HOSPADM

## 2023-11-21 RX ORDER — MODIFIED LANOLIN
OINTMENT (GRAM) TOPICAL
Status: DISCONTINUED | OUTPATIENT
Start: 2023-11-21 | End: 2023-11-22 | Stop reason: HOSPADM

## 2023-11-21 RX ORDER — BISACODYL 10 MG
10 SUPPOSITORY, RECTAL RECTAL DAILY PRN
Status: DISCONTINUED | OUTPATIENT
Start: 2023-11-21 | End: 2023-11-22 | Stop reason: HOSPADM

## 2023-11-21 RX ORDER — MISOPROSTOL 200 UG/1
400 TABLET ORAL
Status: DISCONTINUED | OUTPATIENT
Start: 2023-11-21 | End: 2023-11-22 | Stop reason: HOSPADM

## 2023-11-21 RX ADMIN — KETOROLAC TROMETHAMINE 30 MG: 30 INJECTION, SOLUTION INTRAMUSCULAR; INTRAVENOUS at 05:22

## 2023-11-21 RX ADMIN — BENZOCAINE AND LEVOMENTHOL 1 APPLICATOR: 200; 5 SPRAY TOPICAL at 07:56

## 2023-11-21 RX ADMIN — DOCUSATE SODIUM 100 MG: 100 CAPSULE, LIQUID FILLED ORAL at 08:08

## 2023-11-21 RX ADMIN — EPSOM SALT: 1 GRANULE ORAL; TOPICAL at 15:33

## 2023-11-21 RX ADMIN — IBUPROFEN 800 MG: 800 TABLET ORAL at 18:14

## 2023-11-21 RX ADMIN — IBUPROFEN 800 MG: 800 TABLET ORAL at 12:07

## 2023-11-21 RX ADMIN — LIDOCAINE HYDROCHLORIDE 30 ML: 10 INJECTION, SOLUTION EPIDURAL; INFILTRATION; INTRACAUDAL; PERINEURAL at 04:53

## 2023-11-21 RX ADMIN — Medication 340 ML/HR: at 04:52

## 2023-11-21 RX ADMIN — Medication 7 G: at 07:57

## 2023-11-21 RX ADMIN — ACETAMINOPHEN 650 MG: 325 TABLET ORAL at 12:07

## 2023-11-21 RX ADMIN — SODIUM CHLORIDE, POTASSIUM CHLORIDE, SODIUM LACTATE AND CALCIUM CHLORIDE 125 ML: 600; 310; 30; 20 INJECTION, SOLUTION INTRAVENOUS at 01:28

## 2023-11-21 RX ADMIN — WITCH HAZEL 1 EACH: 500 SOLUTION RECTAL; TOPICAL at 07:56

## 2023-11-21 RX ADMIN — ACETAMINOPHEN 650 MG: 325 TABLET ORAL at 18:14

## 2023-11-21 RX ADMIN — ACETAMINOPHEN 650 MG: 325 TABLET ORAL at 07:57

## 2023-11-21 ASSESSMENT — ACTIVITIES OF DAILY LIVING (ADL)
ADLS_ACUITY_SCORE: 18

## 2023-11-21 NOTE — PLAN OF CARE
Problem: Labor  Goal: Effective Progression to Delivery  Outcome: Progressing   Goal Outcome Evaluation:     Contractions occurring every 5-6 minutes, with cook catheter placed. Has been feeling mild contractions.
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
## 2023-11-21 NOTE — PLAN OF CARE
Preethi had an  at 0444, baby boy. . During labor she was having repetitive deep variable decels that responded to amnioinfusion, apgars 8&9. The patients pain was controlled with an epidural intrapartum and she received Ketorolac IV after delivery. Severe perineal edema, ice applied and encouraged to maintain. Bleeding controlled, breastfeeding independently, no urge to void yet. Instructed to call for assist up to the BR.   Problem: Labor  Goal: Hemostasis  Outcome: Met  Goal: Stable Fetal Wellbeing  Outcome: Met  Goal: Effective Progression to Delivery  Outcome: Met  Goal: Absence of Infection Signs and Symptoms  Outcome: Met  Goal: Acceptable Pain Control  Outcome: Met  Goal: Normal Uterine Contraction Pattern  Outcome: Met   Goal Outcome Evaluation:

## 2023-11-21 NOTE — PROGRESS NOTES
Dr Ji informed of patient's status. Patient is having contractions 5-6 minutes apart. Patient states that her contractions are getting a little stronger. Informed Dr that she did have 1 variable, otherwise has had a category 1 tracing.  States she will put in orders for tonight.

## 2023-11-21 NOTE — ANESTHESIA PROCEDURE NOTES
"Epidural catheter Procedure Note    Pre-Procedure   Staff -        Anesthesiologist:  Osvadlo Bernardo MD       Performed By: anesthesiologist       Location: OB       Procedure Start/Stop Times: 11/20/2023 11:23 PM and 11/20/2023 11:38 PM       Pre-Anesthestic Checklist: patient identified, IV checked, risks and benefits discussed, informed consent, monitors and equipment checked, pre-op evaluation, at physician/surgeon's request and post-op pain management  Timeout:       Correct Patient: Yes        Correct Procedure: Yes        Correct Site: Yes        Correct Position: Yes   Procedure Documentation  Procedure: epidural catheter       Patient Position: sitting       Patient Prep/Sterile Barriers: sterile gloves, patient draped       Skin prep: Chloraprep       Local skin infiltrated with mL of 1% lidocaine.        Insertion Site: L3-4. (midline approach).       Technique: LORT saline        SAGE at 5 cm.       Needle Type: Transinsighty needle       Needle Gauge: 18.        Needle Length (Inches): 3.5        Catheter: 20 G.          Catheter threaded easily.         5 cm epidural space.         Threaded 10 cm at skin.         # of attempts: 1 and  # of redirects:     Assessment/Narrative         Paresthesias: No.       Test dose of 3 mL lidocaine 1.5% w/ 1:200,000 epinephrine at 23:33 CST.         Test dose negative, 3 minutes after injection, for signs of intravascular, subdural, or intrathecal injection.       Insertion/Infusion Method: LORT saline       No aspiration negative for Heme or CSF via Epidural Catheter.       Sensory Level Left: T7.       Sensory Level Right: T7.    Medication(s) Administered   Medication Administration Time: 11/20/2023 11:23 PM      FOR [facility] ([facility]) ONLY:   Pain Team Contact information: please page the Pain Team Via [employer]. Search \"Pain\". During daytime hours, please page the attending first. At night please page the resident first.      "

## 2023-11-21 NOTE — L&D DELIVERY NOTE
23  Gabriela Aguilar   1995    Delivery Note    Procedure:     Physician:  Leda Ji MD       The patient presented to labor and delivery at 41weeks and 1 days for MIL for postterm.  Pregnancy complicated by h/o CS, anxiety, depression, ADHD.  Cervical ripening balloon was used for cervical ripening.  Induction of labor with amniotomy. She progressed to complete cervical dilation, and with maternal pushing, male infant was delivered vertex in VICKEY position at 0444 with Apgars of 8 and at 41 weeks 2 days. Loose nuchal cord. After delayed cord clamping, the umbilical cord was double clamped and cut and the baby was placed on the mother's chest. Weight 3370g (7lb7oz).      The placenta delivered spontaneously at 0455 and appeared complete and intact with possible marginal cord insertion. The uterine fundus was massaged and found to be firm with minimal vaginal bleeding. Total EBL 600cc.     Perineal 2nd degree laceration present.  The laceration was injected with 1% lidocaine until the patient no longer had sensation of this area, and this was repaired with 3-0 Vicryl in a running locked stitch .   The laceration appeared hemostatic at the completion of the repair.     Sponge and needle counts were correct, and the patient tolerated the delivery well overall with the assistance of epidural anesthesia and local anesthetic used in repairing the laceration.  Baby remained with the patient.      Leda Ji MD

## 2023-11-21 NOTE — PROGRESS NOTES
Labor Progress Note    S: Patient feeling comfortable with epidural. She desires AROM. GBS +, received Ancef.    O: Fetal head well applied; AROM with amnihook at 0100 with clear fluid returned; SVE 6/80/-2  FHR: 120bpm, + acels, no decels, moderate variability   [place]: q5-10min    A/P: If no cervical change in 2-3 hours, start low dose pitocin per orders    Leda Ji MD

## 2023-11-21 NOTE — ANESTHESIA POSTPROCEDURE EVALUATION
Patient: Gabriela Aguilar    Procedure: * No procedures listed *       Anesthesia Type:  Epidural    Note:  Disposition: Outpatient   Postop Pain Control: Uneventful            Sign Out: Well controlled pain   PONV: No   Neuro/Psych: Uneventful            Sign Out: Acceptable/Baseline neuro status   Airway/Respiratory: Uneventful            Sign Out: Acceptable/Baseline resp. status   CV/Hemodynamics: Uneventful            Sign Out: Acceptable CV status; No obvious hypovolemia; No obvious fluid overload   Other NRE: NONE   DID A NON-ROUTINE EVENT OCCUR? No    Event details/Postop Comments:  Patient recovering comfortably. Denies HA, n/v, blurred vision, numbness. Voiding appropriately.        Last vitals:  Vitals:    11/21/23 0910 11/21/23 0911 11/21/23 1221   BP: 113/60  122/63   Pulse:   77   Resp: 18  12   Temp: 36.9  C (98.4  F)  37.1  C (98.7  F)   SpO2:  97% 100%       Electronically Signed By: Moncho Silver DO  November 21, 2023  1:44 PM

## 2023-11-21 NOTE — PLAN OF CARE
Goal Outcome Evaluation:  Problem: Postpartum (Vaginal Delivery)  Goal: Optimal Pain Control and Function  Outcome: Progressing  Pt is taking pain medications prn in order to stay on top of pain management. Pt is rating pain a 3 on the 0-10 pain scale.

## 2023-11-21 NOTE — PROGRESS NOTES
Dr. Snyder updated on blood loss total from delivery and first 2 hours of postpartum was 709 ml.  Will order CBC with platelets per Dr. Snyder. No other labs needed at this time.

## 2023-11-21 NOTE — PLAN OF CARE
Problem: Adult Inpatient Plan of Care  Goal: Optimal Comfort and Wellbeing  Intervention: Monitor Pain and Promote Comfort  Recent Flowsheet Documentation  Taken 11/21/2023 3801 by Olivia Shultz, RN  Pain Management Interventions: medication (see MAR)  Taken 11/21/2023 0737 by Olivia Shultz, RN  Pain Management Interventions: (getting up to tub soon) --   Goal Outcome Evaluation:      Plan of Care Reviewed With: patient    Overall Patient Progress: improvingOverall Patient Progress: improving       Patient taking toradol and tylenol, ice packs and dermoplast for pain control to perineum.  Tub baths PRN

## 2023-11-21 NOTE — PROGRESS NOTES
Cook catheter removed. Vaginal exam 6 cm 80 -1. Dr Ji updated on patients status. Epidural orders received.

## 2023-11-22 VITALS
OXYGEN SATURATION: 100 % | BODY MASS INDEX: 26.56 KG/M2 | DIASTOLIC BLOOD PRESSURE: 70 MMHG | HEIGHT: 67 IN | HEART RATE: 80 BPM | TEMPERATURE: 98.2 F | RESPIRATION RATE: 16 BRPM | SYSTOLIC BLOOD PRESSURE: 127 MMHG | WEIGHT: 169.2 LBS

## 2023-11-22 LAB
BASOPHILS # BLD AUTO: 0 10E3/UL (ref 0–0.2)
BASOPHILS NFR BLD AUTO: 0 %
EOSINOPHIL # BLD AUTO: 0.1 10E3/UL (ref 0–0.7)
EOSINOPHIL NFR BLD AUTO: 1 %
ERYTHROCYTE [DISTWIDTH] IN BLOOD BY AUTOMATED COUNT: 12.8 % (ref 10–15)
HCT VFR BLD AUTO: 34.1 % (ref 35–47)
HGB BLD-MCNC: 11.6 G/DL (ref 11.7–15.7)
IMM GRANULOCYTES # BLD: 0.1 10E3/UL
IMM GRANULOCYTES NFR BLD: 0 %
LYMPHOCYTES # BLD AUTO: 1.8 10E3/UL (ref 0.8–5.3)
LYMPHOCYTES NFR BLD AUTO: 15 %
MCH RBC QN AUTO: 31.8 PG (ref 26.5–33)
MCHC RBC AUTO-ENTMCNC: 34 G/DL (ref 31.5–36.5)
MCV RBC AUTO: 93 FL (ref 78–100)
MONOCYTES # BLD AUTO: 0.7 10E3/UL (ref 0–1.3)
MONOCYTES NFR BLD AUTO: 6 %
NEUTROPHILS # BLD AUTO: 8.9 10E3/UL (ref 1.6–8.3)
NEUTROPHILS NFR BLD AUTO: 78 %
NRBC # BLD AUTO: 0 10E3/UL
NRBC BLD AUTO-RTO: 0 /100
PLATELET # BLD AUTO: 144 10E3/UL (ref 150–450)
RBC # BLD AUTO: 3.65 10E6/UL (ref 3.8–5.2)
WBC # BLD AUTO: 11.5 10E3/UL (ref 4–11)

## 2023-11-22 PROCEDURE — 36415 COLL VENOUS BLD VENIPUNCTURE: CPT | Performed by: INTERNAL MEDICINE

## 2023-11-22 PROCEDURE — 85025 COMPLETE CBC W/AUTO DIFF WBC: CPT | Performed by: INTERNAL MEDICINE

## 2023-11-22 PROCEDURE — 250N000013 HC RX MED GY IP 250 OP 250 PS 637: Performed by: OBSTETRICS & GYNECOLOGY

## 2023-11-22 RX ORDER — DOCUSATE SODIUM 100 MG/1
100 CAPSULE, LIQUID FILLED ORAL 2 TIMES DAILY
Qty: 30 CAPSULE | Refills: 1 | Status: SHIPPED | OUTPATIENT
Start: 2023-11-22

## 2023-11-22 RX ORDER — IBUPROFEN 800 MG/1
800 TABLET, FILM COATED ORAL EVERY 8 HOURS PRN
Qty: 21 TABLET | Refills: 0 | Status: SHIPPED | OUTPATIENT
Start: 2023-11-22

## 2023-11-22 RX ADMIN — IBUPROFEN 800 MG: 800 TABLET ORAL at 06:20

## 2023-11-22 RX ADMIN — DOCUSATE SODIUM 100 MG: 100 CAPSULE, LIQUID FILLED ORAL at 12:12

## 2023-11-22 RX ADMIN — ACETAMINOPHEN 650 MG: 325 TABLET ORAL at 06:20

## 2023-11-22 RX ADMIN — IBUPROFEN 800 MG: 800 TABLET ORAL at 00:42

## 2023-11-22 RX ADMIN — IBUPROFEN 800 MG: 800 TABLET ORAL at 12:11

## 2023-11-22 RX ADMIN — ACETAMINOPHEN 650 MG: 325 TABLET ORAL at 00:42

## 2023-11-22 ASSESSMENT — ACTIVITIES OF DAILY LIVING (ADL)
ADLS_ACUITY_SCORE: 18

## 2023-11-22 NOTE — PLAN OF CARE
Goal Outcome Evaluation:      Plan of Care Reviewed With: patient, spouse    Overall Patient Progress: improvingOverall Patient Progress: improving  Preethi's bleeding, voiding and pain are WNL. She states she feels well and ready to go home. Discharge instructions reviewed and she states she has no further questions.

## 2023-11-22 NOTE — DISCHARGE INSTRUCTIONS
Warning Signs after Having a Baby    Keep this paper on your fridge or somewhere else where you can see it.    Call your provider if you have any of these symptoms up to 12 weeks after having your baby.    Thoughts of hurting yourself or your baby  Pain in your chest or trouble breathing  Severe headache not helped by pain medicine  Eyesight concerns (blurry vision, seeing spots or flashes of light, other changes to eyesight)  Fainting, shaking or other signs of a seizure    Call 9-1-1 if you feel that it is an emergency.     The symptoms below can happen to anyone after giving birth. They can be very serious. Call your provider if you have any of these warning signs.    My provider s phone number: _______________________    Losing too much blood (hemorrhage)    Call your provider if you soak through a pad in less than an hour or pass blood clots bigger than a golf ball. These may be signs that you are bleeding too much.    Blood clots in the legs or lungs    After you give birth, your body naturally clots its blood to help prevent blood loss. Sometimes this increased clotting can happen in other areas of the body, like the legs or lungs. This can block your blood flow and be very dangerous.     Call your provider if you:  Have a red, swollen spot on the back of your leg that is warm or painful when you touch it.   Are coughing up blood.     Infection    Call your provider if you have any of these symptoms:  Fever of 100.4 F (38 C) or higher.  Pain or redness around your stitches if you had an incision.   Any yellow, white, or green fluid coming from places where you had stitches or surgery.    Mood Problems (postpartum depression)    Many people feel sad or have mood changes after having a baby. But for some people, these mood swings are worse.     Call your provider right away if you feel so anxious or nervous that you can't care for yourself or your baby.    Preeclampsia (high blood pressure)    Even if you  didn't have high blood pressure when you were pregnant, you are at risk for the high blood pressure disease called preeclampsia. This risk can last up to 12 weeks after giving birth.     Call your provider if you have:   Pain on your right side under your rib cage  Sudden swelling in the hands and face    Remember: You know your body. If something doesn't feel right, get medical help.     For informational purposes only. Not to replace the advice of your health care provider. Copyright 2020 [facility]. All rights reserved. Clinically reviewed by Alma Hernandes, RNC-OB, MSN. [employer] 755474 - Rev 02/23.        [facility] Vaginal Birth Discharge Instructions  Congratulations on the birth of your baby boy!    Please call if your bleeding is bright red more than a pad an hour and doesn't seem to be slowing down. Please call if your temperature goes above 100 degrees. If you have stitches, soaking in a warm, soapy bathtub once or twice a day the first week you are home will keep the area clean and help it heal.     You may use Tylenol 1000mg every 8 hours and/or Ibuprofen 800mg every 8 hours for pain.    Recommend taking prenatal vitamin, vitamin D 2000 units/day, and fish oil with DHA while you are breast-feeding. Assure you are eating adequate amounts of protein (about 75g per day if breastfeeding without supplementation during the first 6 months of your baby's life) as well as healthy fats (avocado, olive oil, avocado oil, coconut oil) while breast-feeding. Drink at least 3 quarts of water per day spread out throughout the day.       Call [phone] for questions during the day and for emergencies on nights and weekends.    Please make an appointment in six weeks.     Recommend taking iron daily. Seeking Health Optimal Iron is a good option that has cofactors to help the iron be absorbed and decrease the side effects of taking iron.

## 2023-11-22 NOTE — PROGRESS NOTES
Postpartum Rounding Note    23  Gabriela Aguilar  1995    Subjective: Patient doing well. She is tolerating general diet, voiding urine without difficulty, and ambulating without lightheadedness. She is breast-feeding. Vaginal bleeding is within normal limits. No complaints. She feels ready to go home today.     Vital signs:  Vitals:    23 2000 23 0042 23 0445 23 0806   BP: 113/66 128/61 127/75 127/70   BP Location:    Left arm   Patient Position:       Cuff Size:       Pulse: 83 78 70 80   Resp: 18 18 16 16   Temp: 98.1  F (36.7  C) 98.2  F (36.8  C) 98.1  F (36.7  C) 98.2  F (36.8  C)   TempSrc: Oral Oral Oral Oral   SpO2: 98% 99% 100% 100%   Weight:       Height:           Physical Exam:  General: Alert, no distress  Abdomen: Non-distended, non-tender, uterine fundus firm and below umbilicus  Extremities: non-edematous, non-erythematous, no calf pain bilaterally    Recent Labs   Lab Test 23  1057 23  1052 23  1149 21  1135   WBC 11.5* 16.0* 9.5  --    HGB 11.6* 11.0* 13.4 12.2   HCT 34.1* 32.4* 38.7  --    * 134* 146*  --        Assessment/Plan: 28 year old PPD#1 s/p  on 23  1. Postpartum    - Afebrile, VSS   - Continue postpartum cares   2. Anxiety, Depression, ADHD   - not on medication during pregnancy  3. Gestational thrombocytopenia   - Plt on admission 146 > now postpartum 134>144 (improved)    Discussed plan of care and reviewed discharge instructions with patient and , and patient expressed understanding. Opportunity for questions given, and all questions were answered.    Disposition: plan for discharge home today, follow up in the clinic in 6 weeks.      Jigna Snyder MD

## 2023-11-22 NOTE — DISCHARGE SUMMARY
Discharge summary    23   Gabriela Aguilar   1995    Admit date: 2023     Discharge date: 23      Admit diagnoses:  1. 28 year old  at 41w1d  2. H/o  x1  3. Induction of labor/TOLAC  4. Anxiety  5. Depression  6. ADHD  7. Gestational thrombocytopenia    Discharge diagnoses:  1. Live male infant born at 41w2d  2. S/p successful   3. Anxiety  4. Depression  5. ADHD  6. Gestational thrombocytopenia    Hospital course:   The patient was admitted at 41w1d for induction of labor and TOLAC. See H&P for details. Labor was uncomplicated. The patient had a male infant by vaginal birth after  at 41w2d; see delivery note for details. After delivery, she was tolerating a normal diet, voiding urine without difficulty, ambulating without lightheadedness, and her vaginal bleeding was within normal limits for postpartum.  She was discharged to home on postpartum day 1.    Rubella immune  Rh positive    Disposition: Discharged to home with follow-up in office in 6 weeks.     Jigna Snyder MD

## 2023-11-22 NOTE — PLAN OF CARE
Goal Outcome Evaluation:      Plan of Care Reviewed With: patient, spouse                 Problem: Adult Inpatient Plan of Care  Goal: Plan of Care Review  Description: The Plan of Care Review/Shift note should be completed every shift.  The Outcome Evaluation is a brief statement about your assessment that the patient is improving, declining, or no change.  This information will be displayed automatically on your shift  note.  Outcome: Progressing  Flowsheets (Taken 11/21/2023 1840)  Plan of Care Reviewed With:   patient   spouse   VSS  Postpartum assesment WNL:  Fundus firm, -1U  Lochia light  2nd degree laceration-swollen/sore, using tucks, ice packs, valerie bottle  Voiding  Minimal pain/cramping managed with prn tylenol and ibuprofen  Breastfeeding, going well

## 2023-11-22 NOTE — LACTATION NOTE
Lactation consultant to patient room to assess breastfeeding. Mom states she breast fed her first child for 18 months without concerns, but is having latch difficulty with this baby.    Reviewed benefit of skin to skin prior to feeding to waken and ready for feeding, importance of feeding baby on early hunger cues, and breastfeeding 8-12 times in 24 hours for adequate infant nutrition and hydration as well as for building an optimal milk supply. Instructed on importance of optimal infant positioning for deep, comfortable latch and effective milk transfer. Mom states she was nursing in cradle hold. Assisted with cross cradle hold, and infant latched deeply with rhythmic sucking. Mom very pleased with how easily infant latched deeply with cross cradle hold. Reviewed techniques for keeping infant actively sucking, including breast compressions.    Anticipatory guidance given on input/output goals, normal feeding volumes in the  period, cluster feeding, engorgement, and pumping (mom has pump for home use.    Answered questions and gave encouragement.

## 2023-11-22 NOTE — CARE PLAN
Pt is managing perineum pain with tylenol and ibuprofen.   Postpartum assessment WNL and VSS.   Pt is hopeful for discharge to home today  Aware provider will round and set discharge plan.  Needs to complete mood assessment and birth certificate.

## 2023-11-24 ENCOUNTER — MEDICAL CORRESPONDENCE (OUTPATIENT)
Dept: HEALTH INFORMATION MANAGEMENT | Facility: CLINIC | Age: 28
End: 2023-11-24
Payer: COMMERCIAL

## 2024-05-02 ENCOUNTER — OFFICE VISIT (OUTPATIENT)
Dept: FAMILY MEDICINE | Facility: CLINIC | Age: 29
End: 2024-05-02
Payer: COMMERCIAL

## 2024-05-02 VITALS
BODY MASS INDEX: 24.7 KG/M2 | RESPIRATION RATE: 16 BRPM | DIASTOLIC BLOOD PRESSURE: 66 MMHG | WEIGHT: 157.38 LBS | OXYGEN SATURATION: 97 % | HEIGHT: 67 IN | TEMPERATURE: 98.2 F | HEART RATE: 75 BPM | SYSTOLIC BLOOD PRESSURE: 110 MMHG

## 2024-05-02 DIAGNOSIS — K64.4 EXTERNAL HEMORRHOIDS: ICD-10-CM

## 2024-05-02 DIAGNOSIS — N32.89 BLADDER SPASMS: Primary | ICD-10-CM

## 2024-05-02 PROCEDURE — 99213 OFFICE O/P EST LOW 20 MIN: CPT | Performed by: FAMILY MEDICINE

## 2024-05-02 NOTE — PROGRESS NOTES
Assessment & Plan     1. Bladder spasms  2. External hemorrhoids  3. Spontaneous vaginal delivery  - Physical Therapy  Referral; Future      Preethi presents today requesting pelvic floor physical therapy.  She is experiencing symptoms of bladder urgency/discomfort with some trickling.  She also has noticed worsening external hemorrhoids, some bright red blood per her rectum when more constipated.    S/p , may benefit from pelvic floor physical therapy.  Referral placed.    Regarding bladder, suspicious for some interstitial cystitis.  Denies stress incontinence symptoms.  Recommend reviewing handout for possible triggers, try to identify dietary triggers and avoid.  If symptoms persisting, can consider urodynamic testing and/or treatment for overactive bladder symptoms.  Actively breast-feeding, would prefer to hold off on medication management while breast-feeding.    Regarding hemorrhoids, recommend constipation management.  Hemorrhoids otherwise asymptomatic.  Recommend increasing fiber.  Already drinking water and exercising regularly.  If persistently bothersome, we can place referral for colorectal for further evaluation and management.      Subjective   Preethi is a 28 year old, presenting for the following health issues:  Rectal Problem      2024     9:33 AM   Additional Questions   Accompanied by Son and daughter     History of Present Illness       Reason for visit:  Pelvic floor therapy referral  Symptom onset:  More than a month  Symptoms include:  Weak pelvic floor  Symptom intensity:  Moderate  Symptom progression:  Staying the same  Had these symptoms before:  Yes  Has tried/received treatment for these symptoms:  Yes  Previous treatment was successful:  Yes  Prior treatment description:  Pelvic floor therapy  What makes it worse:  Coffee  What makes it better:  No    She eats 4 or more servings of fruits and vegetables daily.She consumes 0 sweetened beverage(s) daily.She exercises  "with enough effort to increase her heart rate 30 to 60 minutes per day.  She exercises with enough effort to increase her heart rate 6 days per week.   She is taking medications regularly.        with first delivery. Did pelvic floor PT afterwards.   with 5 month old, reports second degree tear.   Experiencing more issues with hemorrhoids and bladder control.     HEMORRHOIDS: External hemorrhoids. Thinks present since back in college. Haven't been much of an issue, though did notice some slight bleeding. No discomfort.     BLADDER: Tries to get out for a 2 mile walk daily, having a hard time holding bladder. Is breastfeeding, trying to increase hydration. Feels like gets painful, hard to hold in. Has had a few episodes of trickle. Hasn't noticed cough/laugh/sneeze incontinence. Doesn't feel like a UTI.       Review of Systems  See HPI above.       Objective    /66 (BP Location: Left arm, Patient Position: Sitting, Cuff Size: Adult Regular)   Pulse 75   Temp 98.2  F (36.8  C) (Oral)   Resp 16   Ht 1.702 m (5' 7\")   Wt 71.4 kg (157 lb 6 oz)   LMP  (LMP Unknown)   SpO2 97%   Breastfeeding Yes   BMI 24.65 kg/m    Body mass index is 24.65 kg/m .  Physical Exam   GENERAL: alert and no distress  RESP: lungs clear to auscultation - no rales, rhonchi or wheezes  CV: regular rate and rhythm, normal S1 S2, no S3 or S4, no murmur, click or rub, no peripheral edema  ABDOMEN: soft, nontender, no hepatosplenomegaly, no masses and bowel sounds normal          Signed Electronically by: Zenaida Marie, DO    "

## 2024-05-14 NOTE — TELEPHONE ENCOUNTER
Per Dr. Jacobo baby was born 7/[birth year].   Error was due to mom admission date being 7/3/21.   Please create chart for baby with birth date of [birth year]/21.    Blayne Jack, CMA     no

## 2024-07-25 ENCOUNTER — THERAPY VISIT (OUTPATIENT)
Dept: PHYSICAL THERAPY | Facility: REHABILITATION | Age: 29
End: 2024-07-25
Attending: FAMILY MEDICINE
Payer: COMMERCIAL

## 2024-07-25 DIAGNOSIS — N32.89 BLADDER SPASMS: ICD-10-CM

## 2024-07-25 DIAGNOSIS — N94.10 DYSPAREUNIA IN FEMALE: Primary | ICD-10-CM

## 2024-07-25 DIAGNOSIS — K64.4 EXTERNAL HEMORRHOIDS: ICD-10-CM

## 2024-07-25 DIAGNOSIS — R35.1 NOCTURIA: ICD-10-CM

## 2024-07-25 PROCEDURE — 97112 NEUROMUSCULAR REEDUCATION: CPT | Mod: GP

## 2024-07-25 PROCEDURE — 97161 PT EVAL LOW COMPLEX 20 MIN: CPT | Mod: GP

## 2024-07-25 PROCEDURE — 97110 THERAPEUTIC EXERCISES: CPT | Mod: GP

## 2024-07-25 NOTE — PROGRESS NOTES
PHYSICAL THERAPY EVALUATION  Type of Visit: Evaluation    Subjective       Presenting condition or subjective complaint: Pain with sex and frequent urination  Date of onset: 24 (MD order; date of delivery 23)    Relevant medical history:     Dates & types of surgery: C section 2021    Prior diagnostic imaging/testing results:       Prior therapy history for the same diagnosis, illness or injury: Yes Pelvic floor    Prior Level of Function - ind.     Living Environment  Social support: With a significant other or spouse   Type of home: House   Stairs to enter the home: No       Ramp: No   Stairs inside the home: No       Help at home: None  Equipment owned:       Employment: No    Hobbies/Interests:      Patient goals for therapy: Not worry about where the closest bathroom is     Pt reports today for bladder spasms, external hemorrhoids, and is ~8 months s/p  on 23. Reports first thing in the morning 25oz water and 8oz cup of coffee, then goes for a walk for 45 minutes and has to go immediately after. Pain with sex has increased since first pregnancy. Eats food before coffee. Notes bladder spasms toward the end of a walk, lower abdomen starts to hurt when she has to go to the bathroom really badly, is also triggered when she's on a rush to go somewhere. Does use a little footstool to help with straining.     80-100oz water per day    Has not been suggested estradiol     With first child didn't get menstrual cycle back until 12 months postpartum, has noticed her body is definitely impacted by breastfeeding.     Objective      PELVIC EVALUATION  ADDITIONAL HISTORY:  Sex assigned at birth: Female  Gender identity: Female    Pronouns:        Bladder History:  Feels bladder filling: Yes  Triggers for feeling of inability to wait to go to the bathroom: Yes Walks, nursing  Goes to the bathroom more frequently in the morning first thing, does feel a lot of this is mental but very much part of  "routine and that she \"opens the gate\" -- reports at least goes 4x per morning then probably 3x in the afternoon then in the evening 1-2x after dinner and before bed; during the night depends how much water she is drinking with nursing at night will usually go 1-2x  How long can you wait to urinate: Varies  Gets up at night to urinate: Yes 1-2  Can stop the flow of urine when urinating: Sometimes  Volume of urine usually released: Medium   Other issues:    Number of bladder infections in last 12 months:    Fluid intake per day: 120 10  has been drinking more because of breast feeding;   Medications taken for bladder: No     Activities causing urine leak:      Amount of urine typically leaked:    Pads used to help with leaking: No        Bowel History:  Frequency of bowel movement: Every othee day will sometimes take a stool softener; did see someone for this and they recommended to \"up\" her fiber.   Consistency of stool: Soft  mostly   Ignores the urge to defecate: No  Other bowel issues: Straining to have bowel movement; minor pain with passing stool -- did see some mild blood on the toilet paper   Length of time spent trying to have a bowel movement: 1-2 min    Sexual Function History:   Sexual orientation: Straight    Sexually active: Yes  Lubrication used: Yes Yes has tried a variety; tends to use coconut oil.   Pelvic pain: Deep penetration (rectal or vaginal); Pelvic exams  particularly on top, supine tends to be a little better  Pain or difficulty with orgasms/erection/ejaculation: No    State of menopause: Perimenopause (have not gone through menopause yet)  Hormone medications: No      Are you currently pregnant: No  Number of previous pregnancies: 2  Number of deliveries: 2  If you have delivered before, did you have any of these issues during delivery: Tearing;  delivery; Vaginal delivery  Have you been diagnosed with pelvic prolapse or abdominal separation: No  Do you get regular exercise: Yes  I " do this type of exercise: Strength training  Have you tried pelvic floor strengthening exercises for 4 weeks: Yes  Do you have any history of trauma that is relevant to your care that you d like to share: No      Discussed reason for referral regarding pelvic health needs and external/internal pelvic floor muscle examination with patient/guardian.  Opportunity provided to ask questions and verbal consent for assessment and intervention was given.    PAIN: with intercourse and with needing to go to the bathroom badly when in a rush  POSTURE: WNL  LUMBAR SCREEN: AROM WNL  HIP SCREEN: mobility WNL slight tension R>L hip with IR/ER  Strength:  hip abd 4/5 bilateral    Functional Strength Testing:  NT    BREATHING SYMMETRY: Decreased rib cage mobility    PELVIC EXAM  External Visual Inspection:  At rest: Elevated perineal body  With voluntary pelvic floor contraction: Perineal elevation  Relaxation of PFM: Partial/delayed relaxation  With intra-abdominal pressure: Cough: Perineal elevation  Bearing down as defecation: initially perineal elevation, improvement with cueing    Integumentary:   Introitus: Unremarkable, Scar tissue/episiotomy     External Digital Palpation per Perineum: denies tenderness    Scar:   Location/Type: perineal grade 2 s/p  delivery  Mobility: Hypomobile, Pain    Internal Digital Palpation:  Per Vagina:  Tenderness  Myofascial Resistance to Palpation: Firm  Digital Muscle Performance: P (Power): 3/5  R (Repetitions): 10  Compensations: Gluts, improved with cueing to just go to 75% contraction  Relaxation Post-Contraction: Partial/delayed relaxation    ABDOMINAL ASSESSMENT  Diastasis Rectus Abdominis (DEDRA):  DEDRA presence: No   At Umbilicus Depth/Finger width: very mild separation around 1.5 finger width    Abdominal Activation/Strength:  engages    Scar:   Location/Type:  from first delivery  Mobility:  NT: continue to assess    Assessment & Plan   CLINICAL IMPRESSIONS  Medical  Diagnosis: Bladder spasms (N32.89)  - Primary; External hemorrhoids (K64.4); Spontaneous vaginal delivery (O80)    Treatment Diagnosis: Bladder spasms (N32.89)  - Primary; External hemorrhoids (K64.4); Spontaneous vaginal delivery (O80); dyspareunia, nocturia   Impression/Assessment: Patient is a 28 year old female with dyspareunia, urinary urgency, bladder spasms and external hemorrhoids complaints.  The following significant findings have been identified: Pain, Decreased ROM/flexibility, Decreased joint mobility, Decreased strength, Impaired balance, Decreased proprioception, Impaired sensation, Impaired gait, Impaired muscle performance, Decreased activity tolerance, and Impaired posture. These impairments interfere with their ability to perform self care tasks, recreational activities, household mobility, and community mobility as compared to previous level of function.     Clinical Decision Making (Complexity):  Clinical Presentation: Stable/Uncomplicated  Clinical Presentation Rationale: based on medical and personal factors listed in PT evaluation  Clinical Decision Making (Complexity): Low complexity    PLAN OF CARE  Treatment Interventions:  Modalities: Biofeedback, Dry Needling, E-stim, Mechanical Traction, Ultrasound  Interventions: Gait Training, Manual Therapy, Neuromuscular Re-education, Therapeutic Activity, Therapeutic Exercise, Self-Care/Home Management    Long Term Goals     PT Goal 1  Goal Identifier: HEP  Goal Description: Pt will be independent with HEP for self-management of symptoms  Rationale: to maximize safety and independence with performance of ADLs and functional tasks  Target Date: 08/15/24  PT Goal 2  Goal Identifier: Biomechanics  Goal Description: Pt will understand the biomechanics of the pelvic floor and demonstrate appropriate PF and abdominal engagmement with functional activity in order to reduce stress on the pelvic floor.  Rationale: to maximize safety and independence with  performance of ADLs and functional tasks  Target Date: 09/05/24  PT Goal 3  Goal Identifier: Urinary habits  Goal Description: With typical fluid intake, pt will report voiding once every 2-4 hours with minimal to no urgency in order to demonstrate improved bathroom habits.  Rationale: to maximize safety and independence within the community;to maximize safety and independence within the home  Target Date: 10/17/24      Frequency of Treatment: 1x/week  Duration of Treatment: 12 weeks    Education Assessment:   Learner/Method: Patient  Education Comments: Pt understanding, pt consent.    Risks and benefits of evaluation/treatment have been explained.   Patient/Family/caregiver agrees with Plan of Care.     Evaluation Time:     PT Eval, Low Complexity Minutes (69226): 18     Signing Clinician: Yue Acuna PT

## 2024-07-30 ENCOUNTER — E-VISIT (OUTPATIENT)
Dept: FAMILY MEDICINE | Facility: CLINIC | Age: 29
End: 2024-07-30
Payer: COMMERCIAL

## 2024-07-30 DIAGNOSIS — N94.10 DYSPAREUNIA IN FEMALE: Primary | ICD-10-CM

## 2024-07-30 DIAGNOSIS — N95.2 ATROPHIC VAGINITIS: ICD-10-CM

## 2024-07-30 PROCEDURE — 99421 OL DIG E/M SVC 5-10 MIN: CPT | Performed by: FAMILY MEDICINE

## 2024-07-30 NOTE — PATIENT INSTRUCTIONS
Thank you for choosing us for your care. I have placed an order for a prescription so that you can start treatment. View your full visit summary for details by clicking on the link below. Your pharmacist will able to address any questions you may have about the medication.     If you re not feeling better within 2-3 days, please schedule an appointment.  You can schedule an appointment right here in [facility], or call [phone]  If the visit is for the same symptoms as your eVisit, we ll refund the cost of your eVisit if seen within seven days.

## 2024-09-05 ENCOUNTER — THERAPY VISIT (OUTPATIENT)
Dept: PHYSICAL THERAPY | Facility: REHABILITATION | Age: 29
End: 2024-09-05
Payer: COMMERCIAL

## 2024-09-05 DIAGNOSIS — K64.4 EXTERNAL HEMORRHOIDS: ICD-10-CM

## 2024-09-05 DIAGNOSIS — N32.89 BLADDER SPASMS: Primary | ICD-10-CM

## 2024-09-05 DIAGNOSIS — N94.10 DYSPAREUNIA IN FEMALE: ICD-10-CM

## 2024-09-05 DIAGNOSIS — R35.1 NOCTURIA: ICD-10-CM

## 2024-09-05 PROCEDURE — 97140 MANUAL THERAPY 1/> REGIONS: CPT | Mod: GP

## 2024-09-05 PROCEDURE — 97110 THERAPEUTIC EXERCISES: CPT | Mod: GP

## 2024-09-05 PROCEDURE — 97112 NEUROMUSCULAR REEDUCATION: CPT | Mod: GP

## 2024-10-29 ENCOUNTER — OFFICE VISIT (OUTPATIENT)
Dept: FAMILY MEDICINE | Facility: CLINIC | Age: 29
End: 2024-10-29
Payer: COMMERCIAL

## 2024-10-29 VITALS
BODY MASS INDEX: 24.77 KG/M2 | HEART RATE: 67 BPM | DIASTOLIC BLOOD PRESSURE: 79 MMHG | WEIGHT: 157.8 LBS | SYSTOLIC BLOOD PRESSURE: 109 MMHG | HEIGHT: 67 IN | OXYGEN SATURATION: 100 % | RESPIRATION RATE: 16 BRPM

## 2024-10-29 DIAGNOSIS — D17.30 LIPOMA OF SKIN AND SUBCUTANEOUS TISSUE: Primary | ICD-10-CM

## 2024-10-29 PROCEDURE — 99213 OFFICE O/P EST LOW 20 MIN: CPT | Performed by: FAMILY MEDICINE

## 2024-10-29 NOTE — PROGRESS NOTES
Assessment & Plan     ICD-10-CM    1. Lipoma of skin and subcutaneous tissue  D17.30 Adult Gen Surg  Referral        Patient presents today with concerns of a lump that she felt underneath her left ribs.  This is consistent with the moderate size lipoma about 3 x 4 cm.  Patient feels tenderness.  Refer to surgery for further evaluation and management.      MEDICATIONS:  Continue current medications without change    Subjective   Preethi is a 28 year old, presenting for the following health issues:  Mass (Lump on left side of ribs , noticed it on Saturday AM, seems to be about the same size. Pt was poking at it and seems to be tender now. )        10/29/2024    11:15 AM   Additional Questions   Roomed by Danna Sandoval CMA   Accompanied by Spouse & Children     Mass    History of Present Illness       Reason for visit:  Eye puffy and lump on ribs  Symptom onset:  1-3 days ago  Symptoms include:  Lump on rib and right eye is swollen and hurts to blink  Symptom intensity:  Moderate  Symptom progression:  Staying the same  Had these symptoms before:  No  What makes it worse:  No  What makes it better:  No   She is taking medications regularly.     Patient Active Problem List   Diagnosis    Acne    Controlled substance agreement signed    Other specified attention deficit hyperactivity disorder (ADHD)    Situational anxiety    Healthcare maintenance    Encounter for triage in pregnant patient    Hypertension    Pregnant    Bladder spasms    External hemorrhoids    Spontaneous vaginal delivery    Dyspareunia in female    Nocturia     Current Outpatient Medications   Medication Sig Dispense Refill    Ascorbic Acid (VITAMIN C) 500 MG CAPS Take 500 mg by mouth daily      conjugated estrogens (PREMARIN) 0.625 MG/GM vaginal cream Initial: 0.5 to 1 g once daily for 2 weeks, then reduce dose and/or frequency to lowest effective dose (usual maintenance dose: 0.5 to 1 g one to 3 times/week) 30 g 3    folic acid (FOLVITE)  "400 MCG tablet Take by mouth daily      Prenatal Vit-Fe Fumarate-FA (PRENATAL PO) Take by mouth daily      Vitamin D, Cholecalciferol, 10 MCG (400 UNIT) TABS Take 400 Units by mouth      docusate sodium (COLACE) 100 MG capsule Take 1 capsule (100 mg) by mouth 2 times daily (Patient not taking: Reported on 10/29/2024) 30 capsule 1    ibuprofen (ADVIL/MOTRIN) 800 MG tablet Take 1 tablet (800 mg) by mouth every 8 hours as needed for moderate pain (Patient not taking: Reported on 5/2/2024) 21 tablet 0     No current facility-administered medications for this visit.         Review of Systems  Constitutional, neuro, ENT, endocrine, pulmonary, cardiac, gastrointestinal, genitourinary, musculoskeletal, integument and psychiatric systems are negative, except as otherwise noted.      Objective    /79 (BP Location: Left arm, Patient Position: Sitting, Cuff Size: Adult Regular)   Pulse 67   Resp 16   Ht 1.702 m (5' 7\")   Wt 71.6 kg (157 lb 12.8 oz)   SpO2 100%   BMI 24.71 kg/m    Body mass index is 24.71 kg/m .  Physical Exam   GENERAL: alert and no distress  .  3 x 4 cm freely mobile soft lesion consistent with lipoma underneath the left ribs on the lateral wall.            Signed Electronically by: Radha Sanches MD    "

## 2024-11-04 ENCOUNTER — OFFICE VISIT (OUTPATIENT)
Dept: SURGERY | Facility: CLINIC | Age: 29
End: 2024-11-04
Attending: FAMILY MEDICINE
Payer: COMMERCIAL

## 2024-11-04 DIAGNOSIS — D17.30 LIPOMA OF SKIN AND SUBCUTANEOUS TISSUE: ICD-10-CM

## 2024-11-04 PROCEDURE — 99202 OFFICE O/P NEW SF 15 MIN: CPT | Performed by: SURGERY
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
## 2025-01-07 ENCOUNTER — LAB (OUTPATIENT)
Dept: LAB | Facility: CLINIC | Age: 30
End: 2025-01-07
Payer: COMMERCIAL

## 2025-01-07 DIAGNOSIS — R53.83 FATIGUE, UNSPECIFIED TYPE: ICD-10-CM

## 2025-01-07 LAB
ERYTHROCYTE [DISTWIDTH] IN BLOOD BY AUTOMATED COUNT: 11.8 % (ref 10–15)
HCT VFR BLD AUTO: 43.7 % (ref 35–47)
HGB BLD-MCNC: 14.4 G/DL (ref 11.7–15.7)
MCH RBC QN AUTO: 29.8 PG (ref 26.5–33)
MCHC RBC AUTO-ENTMCNC: 33 G/DL (ref 31.5–36.5)
MCV RBC AUTO: 90 FL (ref 78–100)
PLATELET # BLD AUTO: 195 10E3/UL (ref 150–450)
RBC # BLD AUTO: 4.84 10E6/UL (ref 3.8–5.2)
WBC # BLD AUTO: 6.6 10E3/UL (ref 4–11)

## 2025-01-07 PROCEDURE — 84443 ASSAY THYROID STIM HORMONE: CPT

## 2025-01-07 PROCEDURE — 80053 COMPREHEN METABOLIC PANEL: CPT

## 2025-01-07 PROCEDURE — 36415 COLL VENOUS BLD VENIPUNCTURE: CPT

## 2025-01-07 PROCEDURE — 85027 COMPLETE CBC AUTOMATED: CPT

## 2025-01-07 PROCEDURE — 82728 ASSAY OF FERRITIN: CPT

## 2025-01-08 LAB
ALBUMIN SERPL BCG-MCNC: 4.4 G/DL (ref 3.5–5.2)
ALP SERPL-CCNC: 72 U/L (ref 40–150)
ALT SERPL W P-5'-P-CCNC: 14 U/L (ref 0–50)
ANION GAP SERPL CALCULATED.3IONS-SCNC: 10 MMOL/L (ref 7–15)
AST SERPL W P-5'-P-CCNC: 25 U/L (ref 0–45)
BILIRUB SERPL-MCNC: 0.3 MG/DL
BUN SERPL-MCNC: 19.3 MG/DL (ref 6–20)
CALCIUM SERPL-MCNC: 9.1 MG/DL (ref 8.8–10.4)
CHLORIDE SERPL-SCNC: 104 MMOL/L (ref 98–107)
CREAT SERPL-MCNC: 0.81 MG/DL (ref 0.51–0.95)
EGFRCR SERPLBLD CKD-EPI 2021: >90 ML/MIN/1.73M2
FERRITIN SERPL-MCNC: 86 NG/ML (ref 6–175)
GLUCOSE SERPL-MCNC: 93 MG/DL (ref 70–99)
HCO3 SERPL-SCNC: 28 MMOL/L (ref 22–29)
POTASSIUM SERPL-SCNC: 4.2 MMOL/L (ref 3.4–5.3)
PROT SERPL-MCNC: 6.9 G/DL (ref 6.4–8.3)
SODIUM SERPL-SCNC: 142 MMOL/L (ref 135–145)
TSH SERPL DL<=0.005 MIU/L-ACNC: 1.14 UIU/ML (ref 0.3–4.2)

## 2025-01-08 NOTE — RESULT ENCOUNTER NOTE
Patient updated by [employer] message with lab results.      Brandee Oro,  Your labs have returned and all look great.  Your thyroid, iron, metabolic panel, and CBC blood cell counts are all normal.  Zenaida Marie, DO

## 2025-01-27 ENCOUNTER — E-VISIT (OUTPATIENT)
Dept: FAMILY MEDICINE | Facility: CLINIC | Age: 30
End: 2025-01-27
Payer: COMMERCIAL

## 2025-01-27 DIAGNOSIS — R35.0 URINARY FREQUENCY: Primary | ICD-10-CM

## 2025-01-28 NOTE — PATIENT INSTRUCTIONS
Thank you for choosing us for your care. I have placed the below lab(s) for you:  Orders Placed This Encounter   Procedures     UA Macroscopic with reflex to Microscopic and Culture - Lab Collect     Glucose     Hemoglobin A1c        To schedule your lab appointment, please click the Schedule button in your [employer] Home Page.

## 2025-01-30 ENCOUNTER — LAB (OUTPATIENT)
Dept: LAB | Facility: CLINIC | Age: 30
End: 2025-01-30
Payer: COMMERCIAL

## 2025-01-30 DIAGNOSIS — R35.0 URINARY FREQUENCY: ICD-10-CM

## 2025-01-30 LAB
ALBUMIN UR-MCNC: NEGATIVE MG/DL
APPEARANCE UR: CLEAR
BILIRUB UR QL STRIP: NEGATIVE
COLOR UR AUTO: YELLOW
EST. AVERAGE GLUCOSE BLD GHB EST-MCNC: 108 MG/DL
FASTING STATUS PATIENT QL REPORTED: YES
GLUCOSE SERPL-MCNC: 90 MG/DL (ref 70–99)
GLUCOSE UR STRIP-MCNC: NEGATIVE MG/DL
HBA1C MFR BLD: 5.4 % (ref 0–5.6)
HGB UR QL STRIP: NEGATIVE
KETONES UR STRIP-MCNC: NEGATIVE MG/DL
LEUKOCYTE ESTERASE UR QL STRIP: NEGATIVE
NITRATE UR QL: NEGATIVE
PH UR STRIP: 7 [PH] (ref 5–8)
SP GR UR STRIP: 1.02 (ref 1–1.03)
UROBILINOGEN UR STRIP-ACNC: 0.2 E.U./DL

## 2025-02-14 PROBLEM — I10 HYPERTENSION: Status: RESOLVED | Noted: 2023-11-20 | Resolved: 2025-02-14

## 2025-02-14 PROBLEM — Z34.90 PREGNANT: Status: RESOLVED | Noted: 2023-11-20 | Resolved: 2025-02-14

## 2025-02-14 PROBLEM — Z36.89 ENCOUNTER FOR TRIAGE IN PREGNANT PATIENT: Status: RESOLVED | Noted: 2023-09-18 | Resolved: 2025-02-14

## 2025-05-16 ENCOUNTER — OFFICE VISIT (OUTPATIENT)
Dept: FAMILY MEDICINE | Facility: CLINIC | Age: 30
End: 2025-05-16
Payer: COMMERCIAL

## 2025-05-16 VITALS
HEART RATE: 79 BPM | BODY MASS INDEX: 27.62 KG/M2 | TEMPERATURE: 98 F | RESPIRATION RATE: 14 BRPM | OXYGEN SATURATION: 98 % | WEIGHT: 176 LBS | HEIGHT: 67 IN | DIASTOLIC BLOOD PRESSURE: 68 MMHG | SYSTOLIC BLOOD PRESSURE: 114 MMHG

## 2025-05-16 DIAGNOSIS — R59.9 REACTIVE LYMPHADENOPATHY: ICD-10-CM

## 2025-05-16 DIAGNOSIS — R07.89 CHEST WALL PAIN: Primary | ICD-10-CM

## 2025-05-16 PROCEDURE — G2211 COMPLEX E/M VISIT ADD ON: HCPCS | Performed by: FAMILY MEDICINE

## 2025-05-16 PROCEDURE — 3078F DIAST BP <80 MM HG: CPT | Performed by: FAMILY MEDICINE

## 2025-05-16 PROCEDURE — 99213 OFFICE O/P EST LOW 20 MIN: CPT | Performed by: FAMILY MEDICINE

## 2025-05-16 PROCEDURE — 3074F SYST BP LT 130 MM HG: CPT | Performed by: FAMILY MEDICINE

## 2025-05-16 NOTE — PROGRESS NOTES
Assessment & Plan     1. Chest wall pain (Primary)  Preethi's symptoms most consistent with superficial musculoskeletal pain - either strain pec muscles or possibly costochondritis. Symptoms less consistent with cardiac, pulmonary, dermatologic, or GI source.     Recommend scheduled NSAIDS, avoiding aggravating activities, and monitoring.   If symptoms worsening, we can pivot to treatment for GI source.   If no change, evaluate further for cardiac or pulmonary source.    Let me know if not improving.       2. Reactive lymphadenopathy  Recent left ear irritation, now resolved. Exam reassuring. Small, reactive lymph node in cervical chain, suspect reactive. Small, isolated node. No constitutional symptoms.     Recommend monitoring. I anticipate this will normalize over the next few weeks. If persistent, getting bigger, or new symptoms arise, let me know and we can order CBC along with US to better evaluate.       The longitudinal plan of care for the diagnosis(es)/condition(s) as documented were addressed during this visit. Due to the added complexity in care, I will continue to support Preethi in the subsequent management and with ongoing continuity of care.       Todd Oro is a 29 year old, presenting for the following health issues:  Chest Pain (Thought it was heart burn but comes and goes. Maybe a strain from working out)      5/16/2025     2:44 PM   Additional Questions   Roomed by Constantine NARVAEZ MA     History of Present Illness       Reason for visit:  Chest tightness and small bump on neck  Symptom onset:  3-7 days ago  Symptoms include:  Chest feels tight/sore and bump is still on neck  Symptom intensity:  Mild  Symptom progression:  Staying the same  Had these symptoms before:  No  What makes it worse:  Carrying both my kids  What makes it better:  Laying down   She is taking medications regularly.        CHEST: Experiencing heartburn symptoms, then developed chest tightness symptoms right in the middle.  "Had something similar in the past (HS), talked about costochondritis, but doesn't remember heartburn symptoms.    She did do work out with push ups.     Heart burn then work out.     Some stranger things in family.     Continues to work out.     Heart burn more consistent, went away, now more tightness.     Slightly tender to touch over sternum.     More tight after carrying kiddos.     No association with food intake.       NECK BUMP: Was feeling her neck, noticed a bump. Initially thought a muscle not, but didn't seem like it. Has lipoma as well. Wondering if similar.     Did have left ear pain a few days ago, but went away.   No fevers or chills.     Did take ibuprofen for ear ache/right behind ear.       Review of Systems  See HPI above.        Objective    /68   Pulse 79   Temp 98  F (36.7  C)   Resp 14   Ht 1.702 m (5' 7\")   Wt 79.8 kg (176 lb)   SpO2 98%   BMI 27.57 kg/m    Body mass index is 27.57 kg/m .  Physical Exam     GENERAL: alert and no distress  NECK: no adenopathy, no asymmetry, masses, or scars  RESP: lungs clear to auscultation - no rales, rhonchi or wheezes  CV: regular rate and rhythm, no murmurs.   ABDOMEN: soft, nontender, no hepatosplenomegaly, no masses and bowel sounds normal  MS: no gross musculoskeletal defects noted, no edema          Signed Electronically by: Zenaida Marie, DO    "